# Patient Record
Sex: MALE | Race: BLACK OR AFRICAN AMERICAN | Employment: OTHER | URBAN - METROPOLITAN AREA
[De-identification: names, ages, dates, MRNs, and addresses within clinical notes are randomized per-mention and may not be internally consistent; named-entity substitution may affect disease eponyms.]

---

## 2017-01-09 ENCOUNTER — HOSPITAL ENCOUNTER (OUTPATIENT)
Dept: INFUSION THERAPY | Age: 52
Discharge: HOME | End: 2017-01-09

## 2017-01-27 ENCOUNTER — IP HISTORICAL/CONVERTED ENCOUNTER (OUTPATIENT)
Dept: OTHER | Age: 52
End: 2017-01-27

## 2017-02-06 ENCOUNTER — OFFICE VISIT (OUTPATIENT)
Dept: INTERNAL MEDICINE CLINIC | Age: 52
End: 2017-02-06

## 2017-02-06 ENCOUNTER — HOSPITAL ENCOUNTER (OUTPATIENT)
Dept: INFUSION THERAPY | Age: 52
Discharge: HOME | End: 2017-02-06
Payer: COMMERCIAL

## 2017-02-06 VITALS
TEMPERATURE: 98 F | SYSTOLIC BLOOD PRESSURE: 155 MMHG | DIASTOLIC BLOOD PRESSURE: 83 MMHG | RESPIRATION RATE: 18 BRPM | HEART RATE: 72 BPM

## 2017-02-06 VITALS
HEIGHT: 71 IN | RESPIRATION RATE: 16 BRPM | WEIGHT: 205 LBS | HEART RATE: 82 BPM | DIASTOLIC BLOOD PRESSURE: 86 MMHG | SYSTOLIC BLOOD PRESSURE: 171 MMHG | OXYGEN SATURATION: 98 % | TEMPERATURE: 98.1 F | BODY MASS INDEX: 28.7 KG/M2

## 2017-02-06 DIAGNOSIS — I10 HTN (HYPERTENSION), MALIGNANT: ICD-10-CM

## 2017-02-06 DIAGNOSIS — N18.4 CKD (CHRONIC KIDNEY DISEASE) STAGE 4, GFR 15-29 ML/MIN (HCC): Chronic | ICD-10-CM

## 2017-02-06 DIAGNOSIS — N18.4 TYPE 2 DIABETES MELLITUS WITH STAGE 4 CHRONIC KIDNEY DISEASE, WITHOUT LONG-TERM CURRENT USE OF INSULIN (HCC): Primary | Chronic | ICD-10-CM

## 2017-02-06 DIAGNOSIS — Z23 ENCOUNTER FOR IMMUNIZATION: ICD-10-CM

## 2017-02-06 DIAGNOSIS — Z86.73 HISTORY OF CVA (CEREBROVASCULAR ACCIDENT): Chronic | ICD-10-CM

## 2017-02-06 DIAGNOSIS — E78.2 MIXED HYPERLIPIDEMIA: Chronic | ICD-10-CM

## 2017-02-06 DIAGNOSIS — E11.22 TYPE 2 DIABETES MELLITUS WITH STAGE 4 CHRONIC KIDNEY DISEASE, WITHOUT LONG-TERM CURRENT USE OF INSULIN (HCC): Primary | Chronic | ICD-10-CM

## 2017-02-06 LAB
ALBUMIN SERPL BCP-MCNC: 3.2 G/DL (ref 3.5–5)
ANION GAP BLD CALC-SCNC: 10 MMOL/L (ref 5–15)
BUN SERPL-MCNC: 64 MG/DL (ref 6–20)
BUN/CREAT SERPL: 19 (ref 12–20)
CALCIUM SERPL-MCNC: 9 MG/DL (ref 8.5–10.1)
CHLORIDE SERPL-SCNC: 111 MMOL/L (ref 97–108)
CO2 SERPL-SCNC: 24 MMOL/L (ref 21–32)
CREAT SERPL-MCNC: 3.39 MG/DL (ref 0.7–1.3)
ERYTHROCYTE [DISTWIDTH] IN BLOOD BY AUTOMATED COUNT: 17.6 % (ref 11.5–14.5)
GLUCOSE SERPL-MCNC: 154 MG/DL (ref 65–100)
HCT VFR BLD AUTO: 25.4 % (ref 36.6–50.3)
HGB BLD-MCNC: 8 G/DL (ref 12.1–17)
MCH RBC QN AUTO: 27.1 PG (ref 26–34)
MCHC RBC AUTO-ENTMCNC: 31.5 G/DL (ref 30–36.5)
MCV RBC AUTO: 86.1 FL (ref 80–99)
PHOSPHATE SERPL-MCNC: 3.7 MG/DL (ref 2.6–4.7)
PLATELET # BLD AUTO: 451 K/UL (ref 150–400)
POTASSIUM SERPL-SCNC: 3.8 MMOL/L (ref 3.5–5.1)
RBC # BLD AUTO: 2.95 M/UL (ref 4.1–5.7)
SODIUM SERPL-SCNC: 145 MMOL/L (ref 136–145)
URATE SERPL-MCNC: 12.4 MG/DL (ref 3.5–7.2)
WBC # BLD AUTO: 8.3 K/UL (ref 4.1–11.1)

## 2017-02-06 PROCEDURE — 74011250636 HC RX REV CODE- 250/636: Performed by: INTERNAL MEDICINE

## 2017-02-06 PROCEDURE — 80069 RENAL FUNCTION PANEL: CPT | Performed by: INTERNAL MEDICINE

## 2017-02-06 PROCEDURE — 36415 COLL VENOUS BLD VENIPUNCTURE: CPT | Performed by: INTERNAL MEDICINE

## 2017-02-06 PROCEDURE — 96372 THER/PROPH/DIAG INJ SC/IM: CPT

## 2017-02-06 PROCEDURE — 84550 ASSAY OF BLOOD/URIC ACID: CPT | Performed by: INTERNAL MEDICINE

## 2017-02-06 PROCEDURE — 85027 COMPLETE CBC AUTOMATED: CPT | Performed by: INTERNAL MEDICINE

## 2017-02-06 RX ORDER — BUMETANIDE 1 MG/1
1 TABLET ORAL DAILY
Qty: 90 TAB | Refills: 1 | Status: SHIPPED | OUTPATIENT
Start: 2017-02-06 | End: 2017-10-04 | Stop reason: SDUPTHER

## 2017-02-06 RX ORDER — PRAVASTATIN SODIUM 40 MG/1
40 TABLET ORAL
Qty: 90 TAB | Refills: 1 | Status: SHIPPED | OUTPATIENT
Start: 2017-02-06 | End: 2017-12-06 | Stop reason: SDUPTHER

## 2017-02-06 RX ORDER — CARVEDILOL 6.25 MG/1
6.25 TABLET ORAL 2 TIMES DAILY WITH MEALS
Qty: 180 TAB | Refills: 1 | Status: SHIPPED | OUTPATIENT
Start: 2017-02-06 | End: 2017-09-28 | Stop reason: SDUPTHER

## 2017-02-06 RX ORDER — ISOSORBIDE DINITRATE 30 MG/1
30 TABLET ORAL 3 TIMES DAILY
Qty: 270 TAB | Refills: 1 | Status: SHIPPED | OUTPATIENT
Start: 2017-02-06 | End: 2017-10-23 | Stop reason: SDUPTHER

## 2017-02-06 RX ORDER — HYDRALAZINE HYDROCHLORIDE 10 MG/1
TABLET, FILM COATED ORAL
COMMUNITY
Start: 2017-02-05 | End: 2017-02-06 | Stop reason: SDUPTHER

## 2017-02-06 RX ORDER — GLIPIZIDE 2.5 MG/1
TABLET, EXTENDED RELEASE ORAL DAILY
COMMUNITY
End: 2017-02-06 | Stop reason: SDUPTHER

## 2017-02-06 RX ORDER — BUMETANIDE 1 MG/1
TABLET ORAL
COMMUNITY
Start: 2017-02-05 | End: 2017-02-06 | Stop reason: SDUPTHER

## 2017-02-06 RX ORDER — HYDRALAZINE HYDROCHLORIDE 25 MG/1
25 TABLET, FILM COATED ORAL 3 TIMES DAILY
Qty: 270 TAB | Refills: 1 | Status: SHIPPED | OUTPATIENT
Start: 2017-02-06

## 2017-02-06 RX ORDER — PANTOPRAZOLE SODIUM 40 MG/1
40 TABLET, DELAYED RELEASE ORAL DAILY
Qty: 90 TAB | Refills: 1 | Status: SHIPPED | OUTPATIENT
Start: 2017-02-06 | End: 2017-11-06 | Stop reason: SDUPTHER

## 2017-02-06 RX ORDER — CARVEDILOL 6.25 MG/1
TABLET ORAL
COMMUNITY
Start: 2017-02-05 | End: 2017-02-06 | Stop reason: SDUPTHER

## 2017-02-06 RX ORDER — CALCITRIOL 0.25 UG/1
CAPSULE ORAL
COMMUNITY
Start: 2017-02-05 | End: 2017-02-06 | Stop reason: SDUPTHER

## 2017-02-06 RX ORDER — LOSARTAN POTASSIUM 50 MG/1
50 TABLET ORAL DAILY
Qty: 90 TAB | Refills: 1 | Status: SHIPPED | OUTPATIENT
Start: 2017-02-06

## 2017-02-06 RX ORDER — ISOSORBIDE DINITRATE 30 MG/1
TABLET ORAL
COMMUNITY
Start: 2017-02-05 | End: 2017-02-06 | Stop reason: SDUPTHER

## 2017-02-06 RX ORDER — DOXAZOSIN 1 MG/1
1 TABLET ORAL
Qty: 90 TAB | Refills: 1 | Status: SHIPPED | OUTPATIENT
Start: 2017-02-06

## 2017-02-06 RX ORDER — PANTOPRAZOLE SODIUM 40 MG/1
TABLET, DELAYED RELEASE ORAL
COMMUNITY
Start: 2017-02-05 | End: 2017-02-06 | Stop reason: SDUPTHER

## 2017-02-06 RX ORDER — CALCITRIOL 0.25 UG/1
0.25 CAPSULE ORAL DAILY
Qty: 90 CAP | Refills: 1 | Status: SHIPPED | OUTPATIENT
Start: 2017-02-06 | End: 2017-10-03 | Stop reason: SDUPTHER

## 2017-02-06 RX ORDER — GLIPIZIDE 2.5 MG/1
2.5 TABLET, EXTENDED RELEASE ORAL DAILY
Qty: 90 TAB | Refills: 1 | Status: SHIPPED | OUTPATIENT
Start: 2017-02-06 | End: 2017-12-06 | Stop reason: SDUPTHER

## 2017-02-06 RX ADMIN — ERYTHROPOIETIN 40000 UNITS: 40000 INJECTION, SOLUTION INTRAVENOUS; SUBCUTANEOUS at 13:08

## 2017-02-06 NOTE — PROGRESS NOTES
Michelle Triana is a 46 y.o. male who presents for evaluation of hospital follow up. Last seen by me nov 4. Since then, spent a week in [facility] with chf exacerbation. Was there jan 27-feb 4. Doing much better now. Was at work and was increased sob/segal. Had fluid again, was diuresed, and then some blood loss. Had egd which suggested some mild gastritis/ulcerations, was started on protonix. ROS:  Constitutional: negative for fevers, chills, anorexia and weight loss  Eyes:   negative for visual disturbance and irritation  ENT:   negative for tinnitus,sore throat,nasal congestion,ear pain,hoarseness  Respiratory:  negative for cough, hemoptysis, dyspnea,wheezing  CV:   negative for chest pain, palpitations, lower extremity edema  GI:   negative for nausea, vomiting, diarrhea, abdominal pain,melena  Genitourinary: negative for frequency, dysuria and hematuria  Musculoskel: negative for myalgias, arthralgias, back pain, muscle weakness, joint pain  Neurological:  negative for headaches, dizziness, focal weakness, numbness  Psychiatric:     Negative for depression or anxiety      Past Medical History   Diagnosis Date    [facility]     Hypertension     Ill-defined condition     Stroke ([facility]      2009       History reviewed. No pertinent past surgical history. Family History   Problem Relation Age of Onset    Hypertension Mother     Cancer Father     High Cholesterol Father        Social History     Social History    Marital status: SINGLE     Spouse name: N/A    Number of children: N/A    Years of education: N/A     Occupational History    Not on file. Social History Main Topics    Smoking status: Never Smoker    Smokeless tobacco: Never Used    Alcohol use No    Drug use:  Yes    Sexual activity: Yes     Partners: Female     Birth control/ protection: None     Other Topics Concern    Not on file     Social History Narrative            Visit Vitals    /86 (BP 1 Location: Left arm, BP Patient Position: Sitting)    Pulse 82    Temp 98.1 °F (36.7 °C) (Oral)    Resp 16    Ht 5' 11\" (1.803 m)    Wt 205 lb (93 kg)    SpO2 98%    BMI 28.59 kg/m2       Physical Examination:   General - Well appearing male  HEENT - PERRL, TM no erythema/opacification, normal nasal turbinates, no oropharyngeal erythema or exudate, MMM  Neck - supple, no bruits, no thyroidomegaly, no lymphadenopathy  Pulm - clear to auscultation bilaterally  Cardio - RRR, normal S1 S2, no murmur  Abd - soft, nontender, no masses, no HSM  Extrem - no edema, +2 distal pulses  Neuro-  No focal deficits, CN intact           Diabetic foot exam performed by Ervin Montoya III, DO     Measurement  Response Nurse Comment Physician Comment   Monofilament  R - normal sensation with micro filament  L - normal sensation with micro filament     Pulse DP R - present  L - present     Pulse TP R - present  L - present     Structural deformity R - None  L - None     Skin Integrity / Deformity R - Mild - dry skin  L - Mild - dry skin        Reviewed by:              Assessment/Plan:    1.  htn--continue cozaar, coreg. Isordil added last week. Will also increase hydralazine to 25 tid  2. Hx cva--on asa  3. Dm, type 2--on glipizide, last a1c 6.6. Check urine micro  4.  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 Chronic diastolic chf--well compensated now, on bumex, coreg, cozaar. 6.  Routine adult health maintenance--tdap and pneumvax given today.   States had colon done in nov with dr Johnathan Riedel, will call for results    rtc 3 months for bp        Ervin Montoya III, DO

## 2017-02-06 NOTE — PROGRESS NOTES
Reviewed record in preparation for visit and have obtained necessary documentation. Identified pt with two pt identifiers(name and ). Chief Complaint   Patient presents with   [facility] Follow [facility]    Hypertension       Health Maintenance Due   Topic Date Due    FOOT EXAM Q1  1975    MICROALBUMIN Q1  1975    EYE EXAM RETINAL OR DILATED Q1  1975    Pneumococcal 19-64 Highest Risk (1 of 3 - PCV13) 1984    DTaP/Tdap/Td series (1 - Tdap) 1986    FOBT Q 1 YEAR AGE 50-75  2015       Mr. Bland has a reminder for a \"due or due soon\" health maintenance. I have asked that he discuss health maintenance topic(s) due with His  primary care provider. Coordination of Care Questionnaire:  :     1) Have you been to an emergency room, urgent care clinic since your last visit? yes   Hospitalized since your last visit? yes             2) Have you seen or consulted any other health care providers outside of [address] [place] [address] since your last visit? no  (Include any pap smears or colon screenings in this section.)      Patient is accompanied by self I have received verbal consent from [facility] to discuss any/all medical information while they are present in the room.

## 2017-02-06 NOTE — PATIENT INSTRUCTIONS
Diabetes Foot Health: Care Instructions  Your Care Instructions    When you have diabetes, your feet need extra care and attention. Diabetes can damage the nerve endings and blood vessels in your feet, making you less likely to notice when your feet are injured. Diabetes also limits your body's ability to fight infection and get blood to areas that need it. If you get a minor foot injury, it could become an ulcer or a serious infection. With good foot care, you can prevent most of these problems. Caring for your feet can be quick and easy. Most of the care can be done when you are bathing or getting ready for bed. Follow-up care is a key part of your treatment and safety. Be sure to make and go to all appointments, and call your doctor if you are having problems. Its also a good idea to know your test results and keep a list of the medicines you take. How can you care for yourself at home? · Keep your blood sugar close to normal by watching what and how much you eat, monitoring blood sugar, taking medicines if prescribed, and getting regular exercise. · Do not smoke. Smoking affects blood flow and can make foot problems worse. If you need help quitting, talk to your doctor about stop-smoking programs and medicines. These can increase your chances of quitting for good. · Eat a diet that is low in fats. High fat intake can cause fat to build up in your blood vessels and decrease blood flow. · Inspect your feet daily for blisters, cuts, cracks, or sores. If you cannot see well, use a mirror or have someone help you. · Take care of your feet:  [facility] your feet every day. Use warm (not hot) water. Check the water temperature with your wrists or other part of your body, not your feet. ¨ Dry your feet well. Pat them dry. Do not rub the skin on your feet too hard. Dry well between your toes. If the skin on your feet stays moist, bacteria or a fungus can grow, which can lead to infection.   ¨ Keep your skin soft. Use moisturizing skin cream to keep the skin on your feet soft and prevent calluses and cracks. But do not put the cream between your toes, and stop using any cream that causes a rash. ¨ Clean underneath your toenails carefully. Do not use a sharp object to clean underneath your toenails. Use the blunt end of a nail file or other rounded tool. ¨ Trim and file your toenails straight across to prevent ingrown toenails. Use a nail clipper, not scissors. Use an emery board to smooth the edges. · Change socks daily. Socks without seams are best, because seams often rub the feet. You can find socks for people with diabetes from specialty catalogs. · Look inside your shoes every day for things like gravel or torn linings, which could cause blisters or sores. · Buy shoes that fit well:  ¨ Look for shoes that have plenty of space around the toes. This helps prevent bunions and blisters. ¨ Try on shoes while wearing the kind of socks you will usually wear with the shoes. ¨ Avoid plastic shoes. They may rub your feet and cause blisters. Good shoes should be made of materials that are flexible and breathable, such as leather or cloth. ¨ Break in new shoes slowly by wearing them for no more than an hour a day for several days. Take extra time to check your feet for red areas, blisters, or other problems after you wear new shoes. · Do not go barefoot. Do not wear sandals, and do not wear shoes with very thin soles. Thin soles are easy to puncture. They also do not protect your feet from hot pavement or cold weather. · Have your doctor check your feet during each visit. If you have a foot problem, see your doctor. Do not try to treat an early foot problem at home. Home remedies or treatments that you can buy without a prescription (such as corn removers) can be harmful. · Always get early treatment for foot problems. A minor irritation can lead to a major problem if not properly cared for early.   When should you call for help? Call your doctor now or seek immediate medical care if:  · You have a foot sore, an ulcer or break in the skin that is not healing after 4 days, bleeding corns or calluses, or an ingrown toenail. · You have blue or black areas, which can mean bruising or blood flow problems. · You have peeling skin or tiny blisters between your toes or cracking or oozing of the skin. · You have a fever for more than 24 hours and a foot sore. · You have new numbness or tingling in your feet that does not go away after you move your feet or change positions. · You have unexplained or unusual swelling of the foot or ankle. Watch closely for changes in your health, and be sure to contact your doctor if:  · You cannot do proper foot care. Where can you learn more? Go to http://titi-javier.info/. Enter A739 in the search box to learn more about \"Diabetes Foot Health: Care Instructions. \"  Current as of: May 23, 2016  Content Version: 11.1  © 8825-9728 [employer]. Care instructions adapted under license by [employer] (which disclaims liability or warranty for this information). If you have questions about a medical condition or this instruction, always ask your healthcare professional. [address] any warranty or liability for your use of this information.

## 2017-02-06 NOTE — MR AVS SNAPSHOT
Visit Information Date & Time Provider Department Dept. Phone Encounter #  
 2/6/2017 10:00 AM Pao Escalante, [address] [id number] Follow-up Instructions Return in about 3 months (around 5/6/2017) for htn. Upcoming Health Maintenance Date Due MICROALBUMIN Q1 12/26/1975 EYE EXAM RETINAL OR DILATED Q1 12/26/1975 FOBT Q 1 YEAR AGE 50-75 12/26/2015 HEMOGLOBIN A1C Q6M 4/30/2017 LIPID PANEL Q1 11/4/2017 Pneumococcal 19-64 Highest Risk (2 of 3 - PCV13) 2/6/2018 FOOT EXAM Q1 2/6/2018 DTaP/Tdap/Td series (2 - Td) 2/6/2027 Allergies as of 2/6/2017  Review Complete On: 2/6/2017 By: Gilda Cedeno III, DO No Known Allergies Current Immunizations  Reviewed on 12/12/2016 Name Date Influenza Vaccine (Quad) PF 11/4/2016 11:37 AM  
  
 Not reviewed this visit You Were Diagnosed With   
  
 Codes Comments Type 2 diabetes mellitus with stage 4 chronic kidney disease, without long-term current use of insulin (HCC)    -  Primary ICD-10-CM: E11.22, N18.4 ICD-9-CM: 250.40, 585.4 CKD (chronic kidney disease) stage 4, GFR 15-29 ml/min (HCC)     ICD-10-CM: N18.4 ICD-9-CM: 585.4 Mixed hyperlipidemia     ICD-10-CM: E78.2 ICD-9-CM: 272.2 History of CVA (cerebrovascular accident)     ICD-10-CM: Z86.73 
ICD-9-CM: V12.54 HTN (hypertension), malignant     ICD-10-CM: I10 
ICD-9-CM: 401.0 Vitals BP Pulse Temp Resp Height(growth percentile) Weight(growth percentile) 171/86 (BP 1 Location: Left arm, BP Patient Position: Sitting) 82 98.1 °F (36.7 °C) (Oral) 16 5' 11\" (1.803 m) 205 lb (93 kg) SpO2 BMI Smoking Status 98% 28.59 kg/m2 Never Smoker Vitals History BMI and BSA Data Body Mass Index Body Surface Area 28.59 kg/m 2 2.16 m 2 Preferred Pharmacy Pharmacy Name Phone [facility] [address] [phone] Your Updated Medication List  
  
   
This list is accurate as of: 2/6/17 11:17 AM.  Always use your most recent med list.  
  
  
  
  
 aspirin 81 mg chewable tablet Take 81 mg by mouth daily. bumetanide 1 mg tablet Commonly known as:  Liriano Kaylee Take 1 Tab by mouth daily. calcitRIOL 0.25 mcg capsule Commonly known as:  ROCALTROL Take 1 Cap by mouth daily. carvedilol 6.25 mg tablet Commonly known as:  Media Sheppton Take 1 Tab by mouth two (2) times daily (with meals). doxazosin 1 mg tablet Commonly known as:  CARDURA Take 1 Tab by mouth nightly. glipiZIDE SR 2.5 mg CR tablet Commonly known as:  GLUCOTROL XL Take 1 Tab by mouth daily. hydrALAZINE 25 mg tablet Commonly known as:  APRESOLINE Take 1 Tab by mouth three (3) times daily. isosorbide dinitrate 30 mg tablet Commonly known as:  ISORDIL Take 1 Tab by mouth three (3) times daily. losartan 50 mg tablet Commonly known as:  COZAAR Take 1 Tab by mouth daily. pantoprazole 40 mg tablet Commonly known as:  PROTONIX Take 1 Tab by mouth daily. pravastatin 40 mg tablet Commonly known as:  PRAVACHOL Take 1 Tab by mouth nightly. Prescriptions Sent to Pharmacy Refills  
 bumetanide (BUMEX) 1 mg tablet 1 Sig: Take 1 Tab by mouth daily. Class: Normal  
 Pharmacy: [facility] [address] [place] [address] Ph #: [phone] Route: Oral  
 calcitRIOL (ROCALTROL) 0.25 mcg capsule 1 Sig: Take 1 Cap by mouth daily. Class: Normal  
 Pharmacy: [facility] [address] [place] [address] Ph #: [phone] Route: Oral  
 carvedilol (COREG) 6.25 mg tablet 1 Sig: Take 1 Tab by mouth two (2) times daily (with meals). Class: Normal  
 Pharmacy: [facility] [address] [place] [address] Ph #: [phone] Route: Oral  
 hydrALAZINE (APRESOLINE) 25 mg tablet 1  Sig: Take 1 Tab by mouth three (3) times daily. Class: Normal  
 Pharmacy: [address] [place] [address]. [address] Schaffer, [address] Ph #: [phone] Route: Oral  
 isosorbide dinitrate (ISORDIL) 30 mg tablet 1 Sig: Take 1 Tab by mouth three (3) times daily. Class: Normal  
 Pharmacy: [address] [place] [address]. [address] Schaffer, [address] Ph #: [phone] Route: Oral  
 pantoprazole (PROTONIX) 40 mg tablet 1 Sig: Take 1 Tab by mouth daily. Class: Normal  
 Pharmacy: [address] [place] [address]. [address] Schaffer, [address] Ph #: [phone] Route: Oral  
 glipiZIDE SR (GLUCOTROL XL) 2.5 mg CR tablet 1 Sig: Take 1 Tab by mouth daily. Class: Normal  
 Pharmacy: [address] [place] [address]. [address] Schaffer, [address] Ph #: [phone] Route: Oral  
 losartan (COZAAR) 50 mg tablet 1 Sig: Take 1 Tab by mouth daily. Class: Normal  
 Pharmacy: [address] [place] [address]. [address] Schaffer, [address] Ph #: [phone] Route: Oral  
 doxazosin (CARDURA) 1 mg tablet 1 Sig: Take 1 Tab by mouth nightly. Class: Normal  
 Pharmacy: [address] [place] [address]. [address] Schaffer, [address] Ph #: [phone] Route: Oral  
 pravastatin (PRAVACHOL) 40 mg tablet 1 Sig: Take 1 Tab by mouth nightly. Class: Normal  
 Pharmacy: [address] [place] [address]. [address] Schaffer, [address] Ph #: [phone] Route: Oral  
  
We Performed the Following MICROALBUMIN, UR, RAND W/ MICROALBUMIN/CREA RATIO [id number] CPT(R)] Follow-up Instructions Return in about 3 months (around 5/6/2017) for htn. To-Do List   
 03/06/2017 11:00 AM  
  Appointment with [facility] at [facility] ([phone]) Patient Instructions Diabetes Foot Health: Care Instructions Your Care Instructions When you have diabetes, your feet need extra care and attention.  Diabetes can damage the nerve endings and blood vessels in your feet, making you less likely to notice when your feet are injured. Diabetes also limits your body's ability to fight infection and get blood to areas that need it. If you get a minor foot injury, it could become an ulcer or a serious infection. With good foot care, you can prevent most of these problems. Caring for your feet can be quick and easy. Most of the care can be done when you are bathing or getting ready for bed. Follow-up care is a key part of your treatment and safety. Be sure to make and go to all appointments, and call your doctor if you are having problems. Its also a good idea to know your test results and keep a list of the medicines you take. How can you care for yourself at home? · Keep your blood sugar close to normal by watching what and how much you eat, monitoring blood sugar, taking medicines if prescribed, and getting regular exercise. · Do not smoke. Smoking affects blood flow and can make foot problems worse. If you need help quitting, talk to your doctor about stop-smoking programs and medicines. These can increase your chances of quitting for good. · Eat a diet that is low in fats. High fat intake can cause fat to build up in your blood vessels and decrease blood flow. · Inspect your feet daily for blisters, cuts, cracks, or sores. If you cannot see well, use a mirror or have someone help you. · Take care of your feet: 
[facility] your feet every day. Use warm (not hot) water. Check the water temperature with your wrists or other part of your body, not your feet. ¨ Dry your feet well. Pat them dry. Do not rub the skin on your feet too hard. Dry well between your toes. If the skin on your feet stays moist, bacteria or a fungus can grow, which can lead to infection. ¨ Keep your skin soft. Use moisturizing skin cream to keep the skin on your feet soft and prevent calluses and cracks. But do not put the cream between your toes, and stop using any cream that causes a rash.  
¨ Clean underneath your toenails carefully. Do not use a sharp object to clean underneath your toenails. Use the blunt end of a nail file or other rounded tool. ¨ Trim and file your toenails straight across to prevent ingrown toenails. Use a nail clipper, not scissors. Use an emery board to smooth the edges. · Change socks daily. Socks without seams are best, because seams often rub the feet. You can find socks for people with diabetes from specialty catalogs. · Look inside your shoes every day for things like gravel or torn linings, which could cause blisters or sores. · Buy shoes that fit well: 
¨ Look for shoes that have plenty of space around the toes. This helps prevent bunions and blisters. ¨ Try on shoes while wearing the kind of socks you will usually wear with the shoes. ¨ Avoid plastic shoes. They may rub your feet and cause blisters. Good shoes should be made of materials that are flexible and breathable, such as leather or cloth. ¨ Break in new shoes slowly by wearing them for no more than an hour a day for several days. Take extra time to check your feet for red areas, blisters, or other problems after you wear new shoes. · Do not go barefoot. Do not wear sandals, and do not wear shoes with very thin soles. Thin soles are easy to puncture. They also do not protect your feet from hot pavement or cold weather. · Have your doctor check your feet during each visit. If you have a foot problem, see your doctor. Do not try to treat an early foot problem at home. Home remedies or treatments that you can buy without a prescription (such as corn removers) can be harmful. · Always get early treatment for foot problems. A minor irritation can lead to a major problem if not properly cared for early. When should you call for help?  
Call your doctor now or seek immediate medical care if: 
· You have a foot sore, an ulcer or break in the skin that is not healing after 4 days, bleeding corns or calluses, or an ingrown toenail. · You have blue or black areas, which can mean bruising or blood flow problems. · You have peeling skin or tiny blisters between your toes or cracking or oozing of the skin. · You have a fever for more than 24 hours and a foot sore. · You have new numbness or tingling in your feet that does not go away after you move your feet or change positions. · You have unexplained or unusual swelling of the foot or ankle. Watch closely for changes in your health, and be sure to contact your doctor if: 
· You cannot do proper foot care. Where can you learn more? Go to http://titi-javier.info/. Enter A739 in the search box to learn more about \"Diabetes Foot Health: Care Instructions. \" Current as of: May 23, 2016 Content Version: 11.1 © 6334-4914 [employer]. Care instructions adapted under license by [employer] (which disclaims liability or warranty for this information). If you have questions about a medical condition or this instruction, always ask your healthcare professional. [address] any warranty or liability for your use of this information. Introducing [facility]! Dear Gaby Gilmore: Thank you for requesting a Quire account. Our records indicate that you already have an active Quire account. You can access your account anytime at [email or web address] Did you know that you can access your hospital and ER discharge instructions at any time in Quire? You can also review all of your test results from your hospital stay or ER visit. Additional Information If you have questions, please visit the Frequently Asked Questions section of the Quire website at [email or web address] Remember, Quire is NOT to be used for urgent needs. For medical emergencies, dial 911. Now available from your iPhone and Android!  
  
  
 Please provide this summary of care documentation to your next provider. Your primary care clinician is listed as Emory Flores If you have any questions after today's visit, please call [phone].

## 2017-02-06 NOTE — PROGRESS NOTES
1200 Pt arrived to [facility] ambulatory. Assessment completed. Pt denies any distress or discomfort at this time. Labs drawn and sent. Visit Vitals    /83 (BP 1 Location: Left arm, BP Patient Position: At rest)    Pulse 72    Temp 98 °F (36.7 °C)    Resp 18          labs resulted. HGB 8.0 HCT 25.4  1308 procrit 40,000 units SQ given in left arm     1310 Pt tolerated well. No reaction noted. Pt denies any distress or discomfort at this time.  Pt discharged home ambulatory with next apt

## 2017-02-10 ENCOUNTER — TELEPHONE (OUTPATIENT)
Dept: INTERNAL MEDICINE CLINIC | Age: 52
End: 2017-02-10

## 2017-02-10 NOTE — TELEPHONE ENCOUNTER
I called and spoke with patient. Pt was made aware that according to Dr Emely Hu last note, he would like pt to continue with Losartan and to continue dose of Doxazosin.

## 2017-02-10 NOTE — TELEPHONE ENCOUNTER
Pt (p) [phone], pt said it looks like the wrong medication was called in incorrectly, the Losartan  50 mg, pt said he was taken off of it while in the hospital because of the reaction he was getting and the Doxazason 1 mg was written and he kyaw he usually get the  2 mg and he is not sure if Dr Yocasta Tinajero decreased his dosage.      Message received & copied from [facility] after closing 2/9/17

## 2017-02-14 ENCOUNTER — TELEPHONE (OUTPATIENT)
Dept: INTERNAL MEDICINE CLINIC | Age: 52
End: 2017-02-14

## 2017-02-14 NOTE — TELEPHONE ENCOUNTER
I called and spoke with patient. 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 Pt states he had a stroke in 2009 and is wondering if the stroke is resolved. He was informed that since he has had a stroke he is always at higher risk for another. They will not give him a DOT card because he has over 4 conditions. He states that if the stroke is resolved, he would need a letter saying that it has been resolved. Pt states that he would also like to know if he has heart failure.

## 2017-02-14 NOTE — TELEPHONE ENCOUNTER
I called and spoke with patient. He was made aware that the stoke will no go away since it has already happened. He was also informed that he does have heart disease. He verbalized understanding.

## 2017-02-15 ENCOUNTER — TELEPHONE (OUTPATIENT)
Dept: INTERNAL MEDICINE CLINIC | Age: 52
End: 2017-02-15

## 2017-02-15 NOTE — TELEPHONE ENCOUNTER
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     Dm  Heart disease  htn  Kidney disease  Stroke     Please call pt

## 2017-02-16 NOTE — TELEPHONE ENCOUNTER
I called and spoke with patient. He was informed that these conditions will not be resolved as he is being treated for these conditions.

## 2017-02-27 RX ORDER — PRAVASTATIN SODIUM 40 MG/1
40 TABLET ORAL
Qty: 90 TAB | Refills: 1 | Status: CANCELLED | OUTPATIENT
Start: 2017-02-27

## 2017-02-27 RX ORDER — CARVEDILOL 6.25 MG/1
6.25 TABLET ORAL 2 TIMES DAILY WITH MEALS
Qty: 180 TAB | Refills: 1 | Status: CANCELLED | OUTPATIENT
Start: 2017-02-27

## 2017-02-27 RX ORDER — DOXAZOSIN 1 MG/1
1 TABLET ORAL
Qty: 90 TAB | Refills: 1 | Status: CANCELLED | OUTPATIENT
Start: 2017-02-27

## 2017-02-27 RX ORDER — BUMETANIDE 1 MG/1
1 TABLET ORAL DAILY
Qty: 90 TAB | Refills: 1 | Status: CANCELLED | OUTPATIENT
Start: 2017-02-27

## 2017-02-27 RX ORDER — CALCITRIOL 0.25 UG/1
0.25 CAPSULE ORAL DAILY
Qty: 90 CAP | Refills: 1 | Status: CANCELLED | OUTPATIENT
Start: 2017-02-27

## 2017-02-27 RX ORDER — PANTOPRAZOLE SODIUM 40 MG/1
40 TABLET, DELAYED RELEASE ORAL DAILY
Qty: 90 TAB | Refills: 1 | Status: CANCELLED | OUTPATIENT
Start: 2017-02-27

## 2017-02-27 RX ORDER — ISOSORBIDE DINITRATE 30 MG/1
30 TABLET ORAL 3 TIMES DAILY
Qty: 270 TAB | Refills: 1 | Status: CANCELLED | OUTPATIENT
Start: 2017-02-27

## 2017-02-27 RX ORDER — LOSARTAN POTASSIUM 50 MG/1
50 TABLET ORAL DAILY
Qty: 90 TAB | Refills: 1 | Status: CANCELLED | OUTPATIENT
Start: 2017-02-27

## 2017-02-27 NOTE — TELEPHONE ENCOUNTER
I attempted to contact patient. Pt did not answer. I left voicemail message to return call. These rx have been sent on 2/6/17 to [facility].

## 2017-02-27 NOTE — TELEPHONE ENCOUNTER
I called and spoke with patient. Pt was informed that all of his rx were called in on 2/6/17. He states that when he called they didn't have any for him.

## 2017-02-27 NOTE — TELEPHONE ENCOUNTER
Pt called and state that he is needing these 8 medications refilled to the [address] on file. Pt states that the Doxazosin medication is needing to be 2mg. Please call pt if needed.

## 2017-03-06 ENCOUNTER — HOSPITAL ENCOUNTER (OUTPATIENT)
Dept: INFUSION THERAPY | Age: 52
Discharge: HOME | End: 2017-03-06
Payer: COMMERCIAL

## 2017-03-06 ENCOUNTER — PATIENT MESSAGE (OUTPATIENT)
Dept: INTERNAL MEDICINE CLINIC | Age: 52
End: 2017-03-06

## 2017-03-06 VITALS
RESPIRATION RATE: 18 BRPM | HEART RATE: 84 BPM | BODY MASS INDEX: 29.6 KG/M2 | TEMPERATURE: 98 F | SYSTOLIC BLOOD PRESSURE: 151 MMHG | HEIGHT: 71 IN | WEIGHT: 211.44 LBS | DIASTOLIC BLOOD PRESSURE: 79 MMHG

## 2017-03-06 LAB
ALBUMIN SERPL BCP-MCNC: 3.3 G/DL (ref 3.5–5)
ANION GAP BLD CALC-SCNC: 12 MMOL/L (ref 5–15)
BUN SERPL-MCNC: 41 MG/DL (ref 6–20)
BUN/CREAT SERPL: 10 (ref 12–20)
CALCIUM SERPL-MCNC: 8.5 MG/DL (ref 8.5–10.1)
CHLORIDE SERPL-SCNC: 111 MMOL/L (ref 97–108)
CO2 SERPL-SCNC: 24 MMOL/L (ref 21–32)
CREAT SERPL-MCNC: 3.92 MG/DL (ref 0.7–1.3)
ERYTHROCYTE [DISTWIDTH] IN BLOOD BY AUTOMATED COUNT: 16 % (ref 11.5–14.5)
GLUCOSE SERPL-MCNC: 123 MG/DL (ref 65–100)
HCT VFR BLD AUTO: 30 % (ref 36.6–50.3)
HGB BLD-MCNC: 9.1 G/DL (ref 12.1–17)
MCH RBC QN AUTO: 26.8 PG (ref 26–34)
MCHC RBC AUTO-ENTMCNC: 30.3 G/DL (ref 30–36.5)
MCV RBC AUTO: 88.2 FL (ref 80–99)
PHOSPHATE SERPL-MCNC: 3.8 MG/DL (ref 2.6–4.7)
PLATELET # BLD AUTO: 262 K/UL (ref 150–400)
POTASSIUM SERPL-SCNC: 4.7 MMOL/L (ref 3.5–5.1)
RBC # BLD AUTO: 3.4 M/UL (ref 4.1–5.7)
SODIUM SERPL-SCNC: 147 MMOL/L (ref 136–145)
URATE SERPL-MCNC: 9.4 MG/DL (ref 3.5–7.2)
WBC # BLD AUTO: 5.4 K/UL (ref 4.1–11.1)

## 2017-03-06 PROCEDURE — 36415 COLL VENOUS BLD VENIPUNCTURE: CPT | Performed by: INTERNAL MEDICINE

## 2017-03-06 PROCEDURE — 85027 COMPLETE CBC AUTOMATED: CPT | Performed by: INTERNAL MEDICINE

## 2017-03-06 PROCEDURE — 96372 THER/PROPH/DIAG INJ SC/IM: CPT

## 2017-03-06 PROCEDURE — 84550 ASSAY OF BLOOD/URIC ACID: CPT | Performed by: INTERNAL MEDICINE

## 2017-03-06 PROCEDURE — 74011250636 HC RX REV CODE- 250/636: Performed by: INTERNAL MEDICINE

## 2017-03-06 PROCEDURE — 80069 RENAL FUNCTION PANEL: CPT | Performed by: INTERNAL MEDICINE

## 2017-03-06 RX ADMIN — ERYTHROPOIETIN 40000 UNITS: 40000 INJECTION, SOLUTION INTRAVENOUS; SUBCUTANEOUS at 14:25

## 2017-03-06 NOTE — PROGRESS NOTES
[address] [facility] Stay Note:  Arrived - 0528    Visit Vitals    /79 (BP 1 Location: Left arm, BP Patient Position: At rest)    Pulse 84    Temp 98 °F (36.7 °C)    Resp 18    Ht 5' 11\" (1.803 m)    Wt 95.9 kg (211 lb 7 oz)    BMI 29.49 kg/m2       Assessment - unchanged. Pt voiced no complaints or concern. Recent Results (from the past 12 hour(s))   CBC W/O DIFF    Collection Time: 03/06/17  1:13 PM   Result Value Ref Range    WBC 5.4 4.1 - 11.1 K/uL    RBC 3.40 (L) 4.10 - 5.70 M/uL    HGB 9.1 (L) 12.1 - 17.0 g/dL    HCT 30.0 (L) 36.6 - 50.3 %    MCV 88.2 80.0 - 99.0 FL    MCH 26.8 26.0 - 34.0 PG    MCHC 30.3 30.0 - 36.5 g/dL    RDW 16.0 (H) 11.5 - 14.5 %    PLATELET 677 520 - 362 K/uL   RENAL FUNCTION PANEL    Collection Time: 03/06/17  1:13 PM   Result Value Ref Range    Sodium 147 (H) 136 - 145 mmol/L    Potassium 4.7 3.5 - 5.1 mmol/L    Chloride 111 (H) 97 - 108 mmol/L    CO2 24 21 - 32 mmol/L    Anion gap 12 5 - 15 mmol/L    Glucose 123 (H) 65 - 100 mg/dL    BUN 41 (H) 6 - 20 MG/DL    Creatinine 3.92 (H) 0.70 - 1.30 MG/DL    BUN/Creatinine ratio 10 (L) 12 - 20      GFR est AA 20 (L) >60 ml/min/1.73m2    GFR est non-AA 16 (L) >60 ml/min/1.73m2    Calcium 8.5 8.5 - 10.1 MG/DL    Phosphorus 3.8 2.6 - 4.7 MG/DL    Albumin 3.3 (L) 3.5 - 5.0 g/dL   URIC ACID    Collection Time: 03/06/17  1:13 PM   Result Value Ref Range    Uric acid 9.4 (H) 3.5 - 7.2 MG/DL     Procrit 40,000 units SQ slowly in Left arm. 1430 - Tolerated well. Pt denies any acute problems/changes. Discharged from [facility] ambulatory. No distress.  Next appt: 4/3/17 @ 10 am

## 2017-03-16 RX ORDER — DOXAZOSIN 2 MG/1
2 TABLET ORAL
COMMUNITY

## 2017-03-16 NOTE — TELEPHONE ENCOUNTER
From: Isela Crenshaw  To: Gilda Cedeno III DO  Sent: 3/6/2017 9:34 PM EST  Subject: Update Medical Information    Doxazosin is a 2mg tablet. I have informed the doctor's office two times and this is not updated for my prescription. Could you please take care of this matter.

## 2017-04-03 ENCOUNTER — HOSPITAL ENCOUNTER (OUTPATIENT)
Dept: INFUSION THERAPY | Age: 52
Discharge: HOME | End: 2017-04-03

## 2017-05-01 ENCOUNTER — HOSPITAL ENCOUNTER (OUTPATIENT)
Dept: INFUSION THERAPY | Age: 52
Discharge: HOME | End: 2017-05-01
Payer: COMMERCIAL

## 2017-05-15 ENCOUNTER — HOSPITAL ENCOUNTER (OUTPATIENT)
Dept: INFUSION THERAPY | Age: 52
Discharge: HOME | End: 2017-05-15
Payer: COMMERCIAL

## 2017-05-15 VITALS
SYSTOLIC BLOOD PRESSURE: 154 MMHG | TEMPERATURE: 98.2 F | HEART RATE: 70 BPM | RESPIRATION RATE: 18 BRPM | DIASTOLIC BLOOD PRESSURE: 82 MMHG

## 2017-05-15 LAB
ALBUMIN SERPL BCP-MCNC: 3.3 G/DL (ref 3.5–5)
ANION GAP BLD CALC-SCNC: 9 MMOL/L (ref 5–15)
BUN SERPL-MCNC: 57 MG/DL (ref 6–20)
BUN/CREAT SERPL: 16 (ref 12–20)
CALCIUM SERPL-MCNC: 8.5 MG/DL (ref 8.5–10.1)
CHLORIDE SERPL-SCNC: 112 MMOL/L (ref 97–108)
CO2 SERPL-SCNC: 23 MMOL/L (ref 21–32)
CREAT SERPL-MCNC: 3.55 MG/DL (ref 0.7–1.3)
ERYTHROCYTE [DISTWIDTH] IN BLOOD BY AUTOMATED COUNT: 16.2 % (ref 11.5–14.5)
GLUCOSE SERPL-MCNC: 99 MG/DL (ref 65–100)
HCT VFR BLD AUTO: 29.1 % (ref 36.6–50.3)
HGB BLD-MCNC: 9 G/DL (ref 12.1–17)
MCH RBC QN AUTO: 26.7 PG (ref 26–34)
MCHC RBC AUTO-ENTMCNC: 30.9 G/DL (ref 30–36.5)
MCV RBC AUTO: 86.4 FL (ref 80–99)
PHOSPHATE SERPL-MCNC: 3.8 MG/DL (ref 2.6–4.7)
PLATELET # BLD AUTO: 268 K/UL (ref 150–400)
POTASSIUM SERPL-SCNC: 4.7 MMOL/L (ref 3.5–5.1)
RBC # BLD AUTO: 3.37 M/UL (ref 4.1–5.7)
SODIUM SERPL-SCNC: 144 MMOL/L (ref 136–145)
URATE SERPL-MCNC: 10.7 MG/DL (ref 3.5–7.2)
WBC # BLD AUTO: 6.3 K/UL (ref 4.1–11.1)

## 2017-05-15 PROCEDURE — 84550 ASSAY OF BLOOD/URIC ACID: CPT | Performed by: INTERNAL MEDICINE

## 2017-05-15 PROCEDURE — 85027 COMPLETE CBC AUTOMATED: CPT | Performed by: INTERNAL MEDICINE

## 2017-05-15 PROCEDURE — 80069 RENAL FUNCTION PANEL: CPT | Performed by: INTERNAL MEDICINE

## 2017-05-15 PROCEDURE — 74011250636 HC RX REV CODE- 250/636: Performed by: INTERNAL MEDICINE

## 2017-05-15 PROCEDURE — 96372 THER/PROPH/DIAG INJ SC/IM: CPT

## 2017-05-15 PROCEDURE — 36415 COLL VENOUS BLD VENIPUNCTURE: CPT | Performed by: INTERNAL MEDICINE

## 2017-05-15 RX ADMIN — ERYTHROPOIETIN 40000 UNITS: 40000 INJECTION, SOLUTION INTRAVENOUS; SUBCUTANEOUS at 08:54

## 2017-05-15 NOTE — PROGRESS NOTES
[facility] Short Stay Note:  Arrived - [facility]    Visit Vitals    /82 (BP 1 Location: Left arm, BP Patient Position: At rest)    Pulse 70    Temp 98.2 °F (36.8 °C)    Resp 18       Assessment - unchanged. Patient denies any changes to medical hx. No new complaints or concerns. Labs:   Recent Results (from the past 12 hour(s))   CBC W/O DIFF    Collection Time: 05/15/17  8:31 AM   Result Value Ref Range    WBC 6.3 4.1 - 11.1 K/uL    RBC 3.37 (L) 4.10 - 5.70 M/uL    HGB 9.0 (L) 12.1 - 17.0 g/dL    HCT 29.1 (L) 36.6 - 50.3 %    MCV 86.4 80.0 - 99.0 FL    MCH 26.7 26.0 - 34.0 PG    MCHC 30.9 30.0 - 36.5 g/dL    RDW 16.2 (H) 11.5 - 14.5 %    PLATELET 051 650 - 648 K/uL   RENAL FUNCTION PANEL    Collection Time: 05/15/17  8:31 AM   Result Value Ref Range    Sodium 144 136 - 145 mmol/L    Potassium 4.7 3.5 - 5.1 mmol/L    Chloride 112 (H) 97 - 108 mmol/L    CO2 23 21 - 32 mmol/L    Anion gap 9 5 - 15 mmol/L    Glucose 99 65 - 100 mg/dL    BUN 57 (H) 6 - 20 MG/DL    Creatinine 3.55 (H) 0.70 - 1.30 MG/DL    BUN/Creatinine ratio 16 12 - 20      GFR est AA 22 (L) >60 ml/min/1.73m2    GFR est non-AA 18 (L) >60 ml/min/1.73m2    Calcium 8.5 8.5 - 10.1 MG/DL    Phosphorus 3.8 2.6 - 4.7 MG/DL    Albumin 3.3 (L) 3.5 - 5.0 g/dL   URIC ACID    Collection Time: 05/15/17  8:31 AM   Result Value Ref Range    Uric acid 10.7 (H) 3.5 - 7.2 MG/DL     Procrit 40,000 units SQ slowly in Left arm.    0857 - Tolerated well. Pt denies any acute problems/changes. Discharged from [facility] ambulatory. No distress.  Next appt: 6/12/17

## 2017-05-30 ENCOUNTER — APPOINTMENT (OUTPATIENT)
Dept: INFUSION THERAPY | Age: 52
End: 2017-05-30
Payer: COMMERCIAL

## 2017-06-12 ENCOUNTER — HOSPITAL ENCOUNTER (OUTPATIENT)
Dept: INFUSION THERAPY | Age: 52
Discharge: HOME | End: 2017-06-12
Payer: COMMERCIAL

## 2017-06-12 LAB
ALBUMIN SERPL BCP-MCNC: 3.3 G/DL (ref 3.5–5)
ANION GAP BLD CALC-SCNC: 7 MMOL/L (ref 5–15)
BUN SERPL-MCNC: 53 MG/DL (ref 6–20)
BUN/CREAT SERPL: 13 (ref 12–20)
CALCIUM SERPL-MCNC: 8.7 MG/DL (ref 8.5–10.1)
CHLORIDE SERPL-SCNC: 109 MMOL/L (ref 97–108)
CO2 SERPL-SCNC: 24 MMOL/L (ref 21–32)
CREAT SERPL-MCNC: 4.2 MG/DL (ref 0.7–1.3)
ERYTHROCYTE [DISTWIDTH] IN BLOOD BY AUTOMATED COUNT: 15.9 % (ref 11.5–14.5)
FERRITIN SERPL-MCNC: 241 NG/ML (ref 26–388)
GLUCOSE SERPL-MCNC: 114 MG/DL (ref 65–100)
HCT VFR BLD AUTO: 30.6 % (ref 36.6–50.3)
HGB BLD-MCNC: 9.6 G/DL (ref 12.1–17)
IRON SATN MFR SERPL: 26 % (ref 20–50)
IRON SERPL-MCNC: 60 UG/DL (ref 35–150)
MCH RBC QN AUTO: 27 PG (ref 26–34)
MCHC RBC AUTO-ENTMCNC: 31.4 G/DL (ref 30–36.5)
MCV RBC AUTO: 86.2 FL (ref 80–99)
PHOSPHATE SERPL-MCNC: 4.1 MG/DL (ref 2.6–4.7)
PLATELET # BLD AUTO: 267 K/UL (ref 150–400)
POTASSIUM SERPL-SCNC: 4.6 MMOL/L (ref 3.5–5.1)
RBC # BLD AUTO: 3.55 M/UL (ref 4.1–5.7)
SODIUM SERPL-SCNC: 140 MMOL/L (ref 136–145)
TIBC SERPL-MCNC: 230 UG/DL (ref 250–450)
URATE SERPL-MCNC: 10.2 MG/DL (ref 3.5–7.2)
WBC # BLD AUTO: 6.1 K/UL (ref 4.1–11.1)

## 2017-06-12 PROCEDURE — 84550 ASSAY OF BLOOD/URIC ACID: CPT | Performed by: INTERNAL MEDICINE

## 2017-06-12 PROCEDURE — 82728 ASSAY OF FERRITIN: CPT | Performed by: INTERNAL MEDICINE

## 2017-06-12 PROCEDURE — 74011250636 HC RX REV CODE- 250/636: Performed by: INTERNAL MEDICINE

## 2017-06-12 PROCEDURE — 85027 COMPLETE CBC AUTOMATED: CPT | Performed by: INTERNAL MEDICINE

## 2017-06-12 PROCEDURE — 96372 THER/PROPH/DIAG INJ SC/IM: CPT

## 2017-06-12 PROCEDURE — 80069 RENAL FUNCTION PANEL: CPT | Performed by: INTERNAL MEDICINE

## 2017-06-12 PROCEDURE — 83540 ASSAY OF IRON: CPT | Performed by: INTERNAL MEDICINE

## 2017-06-12 PROCEDURE — 36415 COLL VENOUS BLD VENIPUNCTURE: CPT | Performed by: INTERNAL MEDICINE

## 2017-06-12 RX ADMIN — ERYTHROPOIETIN 40000 UNITS: 40000 INJECTION, SOLUTION INTRAVENOUS; SUBCUTANEOUS at 09:40

## 2017-06-26 ENCOUNTER — APPOINTMENT (OUTPATIENT)
Dept: INFUSION THERAPY | Age: 52
End: 2017-06-26
Payer: COMMERCIAL

## 2017-07-10 ENCOUNTER — HOSPITAL ENCOUNTER (OUTPATIENT)
Dept: INFUSION THERAPY | Age: 52
Discharge: HOME | End: 2017-07-10
Payer: COMMERCIAL

## 2017-07-10 VITALS
OXYGEN SATURATION: 99 % | SYSTOLIC BLOOD PRESSURE: 155 MMHG | RESPIRATION RATE: 18 BRPM | HEART RATE: 78 BPM | DIASTOLIC BLOOD PRESSURE: 83 MMHG | TEMPERATURE: 98.2 F

## 2017-07-10 LAB
ALBUMIN SERPL BCP-MCNC: 3.4 G/DL (ref 3.5–5)
ANION GAP BLD CALC-SCNC: 4 MMOL/L (ref 5–15)
BUN SERPL-MCNC: 59 MG/DL (ref 6–20)
BUN/CREAT SERPL: 13 (ref 12–20)
CALCIUM SERPL-MCNC: 8.7 MG/DL (ref 8.5–10.1)
CHLORIDE SERPL-SCNC: 107 MMOL/L (ref 97–108)
CO2 SERPL-SCNC: 28 MMOL/L (ref 21–32)
CREAT SERPL-MCNC: 4.52 MG/DL (ref 0.7–1.3)
ERYTHROCYTE [DISTWIDTH] IN BLOOD BY AUTOMATED COUNT: 15.1 % (ref 11.5–14.5)
GLUCOSE SERPL-MCNC: 93 MG/DL (ref 65–100)
HCT VFR BLD AUTO: 31.2 % (ref 36.6–50.3)
HGB BLD-MCNC: 9.9 G/DL (ref 12.1–17)
MCH RBC QN AUTO: 27.3 PG (ref 26–34)
MCHC RBC AUTO-ENTMCNC: 31.7 G/DL (ref 30–36.5)
MCV RBC AUTO: 86 FL (ref 80–99)
PHOSPHATE SERPL-MCNC: 4.4 MG/DL (ref 2.6–4.7)
PLATELET # BLD AUTO: 254 K/UL (ref 150–400)
POTASSIUM SERPL-SCNC: 5.1 MMOL/L (ref 3.5–5.1)
RBC # BLD AUTO: 3.63 M/UL (ref 4.1–5.7)
SODIUM SERPL-SCNC: 139 MMOL/L (ref 136–145)
URATE SERPL-MCNC: 10.4 MG/DL (ref 3.5–7.2)
WBC # BLD AUTO: 5.7 K/UL (ref 4.1–11.1)

## 2017-07-10 PROCEDURE — 74011250636 HC RX REV CODE- 250/636: Performed by: INTERNAL MEDICINE

## 2017-07-10 PROCEDURE — 84550 ASSAY OF BLOOD/URIC ACID: CPT | Performed by: INTERNAL MEDICINE

## 2017-07-10 PROCEDURE — 36415 COLL VENOUS BLD VENIPUNCTURE: CPT | Performed by: INTERNAL MEDICINE

## 2017-07-10 PROCEDURE — 80069 RENAL FUNCTION PANEL: CPT | Performed by: INTERNAL MEDICINE

## 2017-07-10 PROCEDURE — 85027 COMPLETE CBC AUTOMATED: CPT | Performed by: INTERNAL MEDICINE

## 2017-07-10 PROCEDURE — 96372 THER/PROPH/DIAG INJ SC/IM: CPT

## 2017-07-10 RX ADMIN — ERYTHROPOIETIN 40000 UNITS: 40000 INJECTION, SOLUTION INTRAVENOUS; SUBCUTANEOUS at 11:02

## 2017-07-10 NOTE — PROGRESS NOTES
[address] [facility] Stay Note:  Arrived - 1025    Visit Vitals    /83 (BP 1 Location: Left arm, BP Patient Position: Sitting)    Pulse 78    Temp 98.2 °F (36.8 °C)    Resp 18    SpO2 99%     Labs drawn per orders including CBC w/o diff, Renal panel, and Uric Acid. Recent Results (from the past 12 hour(s))   CBC W/O DIFF    Collection Time: 07/10/17 10:29 AM   Result Value Ref Range    WBC 5.7 4.1 - 11.1 K/uL    RBC 3.63 (L) 4.10 - 5.70 M/uL    HGB 9.9 (L) 12.1 - 17.0 g/dL    HCT 31.2 (L) 36.6 - 50.3 %    MCV 86.0 80.0 - 99.0 FL    MCH 27.3 26.0 - 34.0 PG    MCHC 31.7 30.0 - 36.5 g/dL    RDW 15.1 (H) 11.5 - 14.5 %    PLATELET 530 804 - 918 K/uL   RENAL FUNCTION PANEL    Collection Time: 07/10/17 10:29 AM   Result Value Ref Range    Sodium 139 136 - 145 mmol/L    Potassium 5.1 3.5 - 5.1 mmol/L    Chloride 107 97 - 108 mmol/L    CO2 28 21 - 32 mmol/L    Anion gap 4 (L) 5 - 15 mmol/L    Glucose 93 65 - 100 mg/dL    BUN 59 (H) 6 - 20 MG/DL    Creatinine 4.52 (H) 0.70 - 1.30 MG/DL    BUN/Creatinine ratio 13 12 - 20      GFR est AA 17 (L) >60 ml/min/1.73m2    GFR est non-AA 14 (L) >60 ml/min/1.73m2    Calcium 8.7 8.5 - 10.1 MG/DL    Phosphorus 4.4 2.6 - 4.7 MG/DL    Albumin 3.4 (L) 3.5 - 5.0 g/dL   URIC ACID    Collection Time: 07/10/17 10:29 AM   Result Value Ref Range    Uric acid 10.4 (H) 3.5 - 7.2 MG/DL       Assessment - unchanged. Hgb - 9.9  Procrit 40,000 units SQ slowly in right arm.    1100 - Tolerated well. Pt denies any acute problems/changes. Discharged from [facility] ambulatory. No distress. Next appt: 8/7/17 @ 1000.

## 2017-08-07 ENCOUNTER — HOSPITAL ENCOUNTER (OUTPATIENT)
Dept: INFUSION THERAPY | Age: 52
End: 2017-08-07

## 2017-08-15 ENCOUNTER — HOSPITAL ENCOUNTER (OUTPATIENT)
Dept: INFUSION THERAPY | Age: 52
Discharge: HOME | End: 2017-08-15
Payer: COMMERCIAL

## 2017-08-21 ENCOUNTER — HOSPITAL ENCOUNTER (OUTPATIENT)
Dept: INFUSION THERAPY | Age: 52
Discharge: HOME | End: 2017-08-21
Payer: COMMERCIAL

## 2017-08-21 VITALS
TEMPERATURE: 98.2 F | HEART RATE: 70 BPM | SYSTOLIC BLOOD PRESSURE: 123 MMHG | OXYGEN SATURATION: 99 % | DIASTOLIC BLOOD PRESSURE: 75 MMHG | RESPIRATION RATE: 16 BRPM

## 2017-08-21 LAB
25(OH)D3 SERPL-MCNC: 28.6 NG/ML (ref 30–100)
ALBUMIN SERPL-MCNC: 3.6 G/DL (ref 3.5–5)
ANION GAP SERPL CALC-SCNC: 4 MMOL/L (ref 5–15)
BUN SERPL-MCNC: 63 MG/DL (ref 6–20)
BUN/CREAT SERPL: 14 (ref 12–20)
CALCIUM SERPL-MCNC: 8.9 MG/DL (ref 8.5–10.1)
CALCIUM SERPL-MCNC: 8.9 MG/DL (ref 8.5–10.1)
CHLORIDE SERPL-SCNC: 108 MMOL/L (ref 97–108)
CO2 SERPL-SCNC: 27 MMOL/L (ref 21–32)
CREAT SERPL-MCNC: 4.35 MG/DL (ref 0.7–1.3)
CREAT UR-MCNC: 25.8 MG/DL
ERYTHROCYTE [DISTWIDTH] IN BLOOD BY AUTOMATED COUNT: 15.1 % (ref 11.5–14.5)
GLUCOSE SERPL-MCNC: 83 MG/DL (ref 65–100)
HCT VFR BLD AUTO: 30.5 % (ref 36.6–50.3)
HGB BLD-MCNC: 9.7 G/DL (ref 12.1–17)
MCH RBC QN AUTO: 27.8 PG (ref 26–34)
MCHC RBC AUTO-ENTMCNC: 31.8 G/DL (ref 30–36.5)
MCV RBC AUTO: 87.4 FL (ref 80–99)
PHOSPHATE SERPL-MCNC: 4 MG/DL (ref 2.6–4.7)
PLATELET # BLD AUTO: 264 K/UL (ref 150–400)
POTASSIUM SERPL-SCNC: 5.5 MMOL/L (ref 3.5–5.1)
PTH-INTACT SERPL-MCNC: 247.7 PG/ML (ref 14–72)
RBC # BLD AUTO: 3.49 M/UL (ref 4.1–5.7)
SODIUM SERPL-SCNC: 139 MMOL/L (ref 136–145)
URATE SERPL-MCNC: 10.4 MG/DL (ref 3.5–7.2)
WBC # BLD AUTO: 5.7 K/UL (ref 4.1–11.1)

## 2017-08-21 PROCEDURE — 83970 ASSAY OF PARATHORMONE: CPT | Performed by: INTERNAL MEDICINE

## 2017-08-21 PROCEDURE — 80069 RENAL FUNCTION PANEL: CPT | Performed by: INTERNAL MEDICINE

## 2017-08-21 PROCEDURE — 85027 COMPLETE CBC AUTOMATED: CPT | Performed by: INTERNAL MEDICINE

## 2017-08-21 PROCEDURE — 74011250636 HC RX REV CODE- 250/636: Performed by: INTERNAL MEDICINE

## 2017-08-21 PROCEDURE — 96372 THER/PROPH/DIAG INJ SC/IM: CPT

## 2017-08-21 PROCEDURE — 82570 ASSAY OF URINE CREATININE: CPT | Performed by: INTERNAL MEDICINE

## 2017-08-21 PROCEDURE — 82306 VITAMIN D 25 HYDROXY: CPT | Performed by: INTERNAL MEDICINE

## 2017-08-21 PROCEDURE — 84550 ASSAY OF BLOOD/URIC ACID: CPT | Performed by: INTERNAL MEDICINE

## 2017-08-21 PROCEDURE — 36415 COLL VENOUS BLD VENIPUNCTURE: CPT | Performed by: INTERNAL MEDICINE

## 2017-08-21 RX ADMIN — ERYTHROPOIETIN 40000 UNITS: 40000 INJECTION, SOLUTION INTRAVENOUS; SUBCUTANEOUS at 09:45

## 2017-08-21 NOTE — PROGRESS NOTES
0855 Pt arrived at [facility] ambulatory and in no distress for Procrit and Labs. Assessment completed, no new complaints voiced. /75 (BP 1 Location: Left arm, BP Patient Position: Sitting)  Pulse 70  Temp 98.2 °F (36.8 °C)  Resp 16  SpO2 99%     Recent Results (from the past 12 hour(s))   RENAL FUNCTION PANEL    Collection Time: 08/21/17  9:02 AM   Result Value Ref Range    Sodium 139 136 - 145 mmol/L    Potassium 5.5 (H) 3.5 - 5.1 mmol/L    Chloride 108 97 - 108 mmol/L    CO2 27 21 - 32 mmol/L    Anion gap 4 (L) 5 - 15 mmol/L    Glucose 83 65 - 100 mg/dL    BUN 63 (H) 6 - 20 MG/DL    Creatinine 4.35 (H) 0.70 - 1.30 MG/DL    BUN/Creatinine ratio 14 12 - 20      GFR est AA 18 (L) >60 ml/min/1.73m2    GFR est non-AA 14 (L) >60 ml/min/1.73m2    Calcium 8.9 8.5 - 10.1 MG/DL    Phosphorus 4.0 2.6 - 4.7 MG/DL    Albumin 3.6 3.5 - 5.0 g/dL   URIC ACID    Collection Time: 08/21/17  9:02 AM   Result Value Ref Range    Uric acid 10.4 (H) 3.5 - 7.2 MG/DL   CBC W/O DIFF    Collection Time: 08/21/17  9:02 AM   Result Value Ref Range    WBC 5.7 4.1 - 11.1 K/uL    RBC 3.49 (L) 4.10 - 5.70 M/uL    HGB 9.7 (L) 12.1 - 17.0 g/dL    HCT 30.5 (L) 36.6 - 50.3 %    MCV 87.4 80.0 - 99.0 FL    MCH 27.8 26.0 - 34.0 PG    MCHC 31.8 30.0 - 36.5 g/dL    RDW 15.1 (H) 11.5 - 14.5 %    PLATELET 680 869 - 590 K/uL   CREATININE, UR, RANDOM    Collection Time: 08/21/17  9:03 AM   Result Value Ref Range    Creatinine, urine 25.80 mg/dL       Multiple labs still pending. See [facility] for results. Medications received:  Procrit 40,000 units sq    0945 Tolerated treatment well, no adverse reaction noted. D/Cd from [facility] ambulatory and in no distress accompanied by self. Next appt 9/18 @ 1000.

## 2017-09-05 ENCOUNTER — APPOINTMENT (OUTPATIENT)
Dept: INFUSION THERAPY | Age: 52
End: 2017-09-05
Payer: COMMERCIAL

## 2017-09-06 ENCOUNTER — TELEPHONE (OUTPATIENT)
Dept: INTERNAL MEDICINE CLINIC | Age: 52
End: 2017-09-06

## 2017-09-06 NOTE — TELEPHONE ENCOUNTER
Susie Jeffries 82 neurology [facility] would like to speak with the nurse about cardiac history. Please call.

## 2017-09-12 ENCOUNTER — HOSPITAL ENCOUNTER (OUTPATIENT)
Age: 52
Discharge: HOME | End: 2017-09-27
Attending: PHYSICAL MEDICINE & REHABILITATION | Admitting: PHYSICAL MEDICINE & REHABILITATION

## 2017-09-12 ENCOUNTER — APPOINTMENT (OUTPATIENT)
Dept: INFUSION THERAPY | Age: 52
End: 2017-09-12
Payer: COMMERCIAL

## 2017-09-12 PROCEDURE — 74011250637 HC RX REV CODE- 250/637: Performed by: PHYSICAL MEDICINE & REHABILITATION

## 2017-09-12 PROCEDURE — 74011250636 HC RX REV CODE- 250/636: Performed by: PHYSICAL MEDICINE & REHABILITATION

## 2017-09-12 RX ORDER — ONDANSETRON 4 MG/1
4 TABLET, ORALLY DISINTEGRATING ORAL
Status: DISCONTINUED | OUTPATIENT
Start: 2017-09-12 | End: 2017-09-27 | Stop reason: HOSPADM

## 2017-09-12 RX ORDER — ADHESIVE BANDAGE
30 BANDAGE TOPICAL DAILY PRN
Status: DISCONTINUED | OUTPATIENT
Start: 2017-09-12 | End: 2017-09-27 | Stop reason: HOSPADM

## 2017-09-12 RX ORDER — HYDRALAZINE HYDROCHLORIDE 25 MG/1
25 TABLET, FILM COATED ORAL 2 TIMES DAILY
Status: DISCONTINUED | OUTPATIENT
Start: 2017-09-13 | End: 2017-09-27 | Stop reason: HOSPADM

## 2017-09-12 RX ORDER — GUAIFENESIN 100 MG/5ML
81 LIQUID (ML) ORAL DAILY
Status: DISCONTINUED | OUTPATIENT
Start: 2017-09-13 | End: 2017-09-27 | Stop reason: HOSPADM

## 2017-09-12 RX ORDER — PANTOPRAZOLE SODIUM 40 MG/1
40 TABLET, DELAYED RELEASE ORAL
Status: DISCONTINUED | OUTPATIENT
Start: 2017-09-13 | End: 2017-09-27 | Stop reason: HOSPADM

## 2017-09-12 RX ORDER — CARVEDILOL 6.25 MG/1
6.25 TABLET ORAL 2 TIMES DAILY WITH MEALS
Status: DISCONTINUED | OUTPATIENT
Start: 2017-09-13 | End: 2017-09-27 | Stop reason: HOSPADM

## 2017-09-12 RX ORDER — DEXTROSE 50 % IN WATER (D50W) INTRAVENOUS SYRINGE
25 AS NEEDED
Status: DISCONTINUED | OUTPATIENT
Start: 2017-09-12 | End: 2017-09-27 | Stop reason: HOSPADM

## 2017-09-12 RX ORDER — ACETAMINOPHEN 325 MG/1
650 TABLET ORAL
Status: DISCONTINUED | OUTPATIENT
Start: 2017-09-12 | End: 2017-09-27 | Stop reason: HOSPADM

## 2017-09-12 RX ORDER — INSULIN LISPRO 100 [IU]/ML
INJECTION, SOLUTION INTRAVENOUS; SUBCUTANEOUS
Status: DISCONTINUED | OUTPATIENT
Start: 2017-09-12 | End: 2017-09-25

## 2017-09-12 RX ORDER — VALSARTAN 80 MG/1
80 TABLET ORAL DAILY
Status: DISCONTINUED | OUTPATIENT
Start: 2017-09-13 | End: 2017-09-27 | Stop reason: HOSPADM

## 2017-09-12 RX ORDER — NITROGLYCERIN 0.4 MG/1
0.4 TABLET SUBLINGUAL
Status: DISCONTINUED | OUTPATIENT
Start: 2017-09-12 | End: 2017-09-27 | Stop reason: HOSPADM

## 2017-09-12 RX ORDER — GLIPIZIDE 2.5 MG/1
2.5 TABLET, EXTENDED RELEASE ORAL
Status: DISCONTINUED | OUTPATIENT
Start: 2017-09-13 | End: 2017-09-27 | Stop reason: HOSPADM

## 2017-09-12 RX ORDER — BUMETANIDE 1 MG/1
1 TABLET ORAL DAILY
Status: DISCONTINUED | OUTPATIENT
Start: 2017-09-13 | End: 2017-09-27 | Stop reason: HOSPADM

## 2017-09-12 RX ORDER — DOXAZOSIN 2 MG/1
1 TABLET ORAL DAILY
Status: DISCONTINUED | OUTPATIENT
Start: 2017-09-13 | End: 2017-09-27 | Stop reason: HOSPADM

## 2017-09-12 RX ORDER — ZOLPIDEM TARTRATE 5 MG/1
5 TABLET ORAL
Status: DISCONTINUED | OUTPATIENT
Start: 2017-09-12 | End: 2017-09-27 | Stop reason: HOSPADM

## 2017-09-12 RX ORDER — HEPARIN SODIUM 5000 [USP'U]/ML
5000 INJECTION, SOLUTION INTRAVENOUS; SUBCUTANEOUS EVERY 8 HOURS
Status: DISCONTINUED | OUTPATIENT
Start: 2017-09-12 | End: 2017-09-27 | Stop reason: HOSPADM

## 2017-09-12 RX ORDER — AMOXICILLIN 250 MG
1 CAPSULE ORAL
Status: DISCONTINUED | OUTPATIENT
Start: 2017-09-12 | End: 2017-09-27 | Stop reason: HOSPADM

## 2017-09-12 RX ORDER — CALCITRIOL 0.25 UG/1
0.25 CAPSULE ORAL DAILY
Status: DISCONTINUED | OUTPATIENT
Start: 2017-09-13 | End: 2017-09-27 | Stop reason: HOSPADM

## 2017-09-12 RX ORDER — DOXAZOSIN 2 MG/1
2 TABLET ORAL
Status: DISCONTINUED | OUTPATIENT
Start: 2017-09-12 | End: 2017-09-27 | Stop reason: HOSPADM

## 2017-09-12 RX ORDER — PRAVASTATIN SODIUM 40 MG/1
40 TABLET ORAL
Status: DISCONTINUED | OUTPATIENT
Start: 2017-09-12 | End: 2017-09-27 | Stop reason: HOSPADM

## 2017-09-12 RX ORDER — MAGNESIUM SULFATE 100 %
16 CRYSTALS MISCELLANEOUS AS NEEDED
Status: DISCONTINUED | OUTPATIENT
Start: 2017-09-12 | End: 2017-09-27 | Stop reason: HOSPADM

## 2017-09-12 RX ORDER — FACIAL-BODY WIPES
10 EACH TOPICAL DAILY PRN
Status: DISCONTINUED | OUTPATIENT
Start: 2017-09-12 | End: 2017-09-27 | Stop reason: HOSPADM

## 2017-09-12 RX ADMIN — ISOSORBIDE DINITRATE 30 MG: 10 TABLET ORAL at 21:50

## 2017-09-12 RX ADMIN — SENNOSIDES AND DOCUSATE SODIUM 1 TABLET: 8.6; 5 TABLET ORAL at 21:50

## 2017-09-12 RX ADMIN — PRAVASTATIN SODIUM 40 MG: 40 TABLET ORAL at 21:50

## 2017-09-12 RX ADMIN — HEPARIN SODIUM 5000 UNITS: 5000 INJECTION, SOLUTION INTRAVENOUS; SUBCUTANEOUS at 21:50

## 2017-09-12 RX ADMIN — DOXAZOSIN 2 MG: 2 TABLET ORAL at 21:50

## 2017-09-13 LAB
25(OH)D3 SERPL-MCNC: 23.5 NG/ML (ref 30–100)
ALBUMIN SERPL-MCNC: 2.9 G/DL (ref 3.5–5)
ALBUMIN SERPL-MCNC: 3.1 G/DL (ref 3.5–5)
ALBUMIN/GLOB SERPL: 0.6 {RATIO} (ref 1.1–2.2)
ALP SERPL-CCNC: 86 U/L (ref 45–117)
ALT SERPL-CCNC: 11 U/L (ref 12–78)
ANION GAP SERPL CALC-SCNC: 10 MMOL/L (ref 5–15)
ANION GAP SERPL CALC-SCNC: 9 MMOL/L (ref 5–15)
APPEARANCE UR: CLEAR
AST SERPL-CCNC: 11 U/L (ref 15–37)
BACTERIA URNS QL MICRO: NEGATIVE /HPF
BILIRUB SERPL-MCNC: 0.5 MG/DL (ref 0.2–1)
BILIRUB UR QL: NEGATIVE
BUN SERPL-MCNC: 56 MG/DL (ref 6–20)
BUN SERPL-MCNC: 58 MG/DL (ref 6–20)
BUN/CREAT SERPL: 16 (ref 12–20)
BUN/CREAT SERPL: 17 (ref 12–20)
CALCIUM SERPL-MCNC: 8.6 MG/DL (ref 8.5–10.1)
CALCIUM SERPL-MCNC: 8.9 MG/DL (ref 8.5–10.1)
CHLORIDE SERPL-SCNC: 106 MMOL/L (ref 97–108)
CHLORIDE SERPL-SCNC: 111 MMOL/L (ref 97–108)
CO2 SERPL-SCNC: 19 MMOL/L (ref 21–32)
CO2 SERPL-SCNC: 23 MMOL/L (ref 21–32)
COLOR UR: ABNORMAL
CREAT SERPL-MCNC: 3.23 MG/DL (ref 0.7–1.3)
CREAT SERPL-MCNC: 3.69 MG/DL (ref 0.7–1.3)
EPITH CASTS URNS QL MICRO: ABNORMAL /LPF
ERYTHROCYTE [DISTWIDTH] IN BLOOD BY AUTOMATED COUNT: 15.4 % (ref 11.5–14.5)
GLOBULIN SER CALC-MCNC: 4.9 G/DL (ref 2–4)
GLUCOSE SERPL-MCNC: 179 MG/DL (ref 65–100)
GLUCOSE SERPL-MCNC: 99 MG/DL (ref 65–100)
GLUCOSE UR STRIP.AUTO-MCNC: NEGATIVE MG/DL
HCT VFR BLD AUTO: 32.6 % (ref 36.6–50.3)
HGB BLD-MCNC: 10.3 G/DL (ref 12.1–17)
HGB UR QL STRIP: ABNORMAL
KETONES UR QL STRIP.AUTO: NEGATIVE MG/DL
LEUKOCYTE ESTERASE UR QL STRIP.AUTO: NEGATIVE
MAGNESIUM SERPL-MCNC: 2.2 MG/DL (ref 1.6–2.4)
MCH RBC QN AUTO: 27 PG (ref 26–34)
MCHC RBC AUTO-ENTMCNC: 31.6 G/DL (ref 30–36.5)
MCV RBC AUTO: 85.6 FL (ref 80–99)
NITRITE UR QL STRIP.AUTO: NEGATIVE
PH UR STRIP: 5 [PH] (ref 5–8)
PHOSPHATE SERPL-MCNC: 4 MG/DL (ref 2.6–4.7)
PLATELET # BLD AUTO: 355 K/UL (ref 150–400)
POTASSIUM SERPL-SCNC: 4.8 MMOL/L (ref 3.5–5.1)
POTASSIUM SERPL-SCNC: 5 MMOL/L (ref 3.5–5.1)
PROT SERPL-MCNC: 8 G/DL (ref 6.4–8.2)
PROT UR STRIP-MCNC: 100 MG/DL
RBC # BLD AUTO: 3.81 M/UL (ref 4.1–5.7)
RBC #/AREA URNS HPF: ABNORMAL /HPF (ref 0–5)
SODIUM SERPL-SCNC: 138 MMOL/L (ref 136–145)
SODIUM SERPL-SCNC: 140 MMOL/L (ref 136–145)
SP GR UR REFRACTOMETRY: 1.02 (ref 1–1.03)
UROBILINOGEN UR QL STRIP.AUTO: 1 EU/DL (ref 0.2–1)
WBC # BLD AUTO: 7.4 K/UL (ref 4.1–11.1)
WBC URNS QL MICRO: ABNORMAL /HPF (ref 0–4)

## 2017-09-13 PROCEDURE — 36415 COLL VENOUS BLD VENIPUNCTURE: CPT | Performed by: PHYSICAL MEDICINE & REHABILITATION

## 2017-09-13 PROCEDURE — 82306 VITAMIN D 25 HYDROXY: CPT | Performed by: PHYSICAL MEDICINE & REHABILITATION

## 2017-09-13 PROCEDURE — 87086 URINE CULTURE/COLONY COUNT: CPT | Performed by: PHYSICAL MEDICINE & REHABILITATION

## 2017-09-13 PROCEDURE — 83735 ASSAY OF MAGNESIUM: CPT | Performed by: PHYSICAL MEDICINE & REHABILITATION

## 2017-09-13 PROCEDURE — 80053 COMPREHEN METABOLIC PANEL: CPT | Performed by: PHYSICAL MEDICINE & REHABILITATION

## 2017-09-13 PROCEDURE — 74011250636 HC RX REV CODE- 250/636: Performed by: PHYSICAL MEDICINE & REHABILITATION

## 2017-09-13 PROCEDURE — 74011250637 HC RX REV CODE- 250/637: Performed by: PHYSICAL MEDICINE & REHABILITATION

## 2017-09-13 PROCEDURE — 81001 URINALYSIS AUTO W/SCOPE: CPT | Performed by: PHYSICAL MEDICINE & REHABILITATION

## 2017-09-13 PROCEDURE — 74011636637 HC RX REV CODE- 636/637: Performed by: PHYSICAL MEDICINE & REHABILITATION

## 2017-09-13 PROCEDURE — 80069 RENAL FUNCTION PANEL: CPT | Performed by: PHYSICAL MEDICINE & REHABILITATION

## 2017-09-13 PROCEDURE — 85027 COMPLETE CBC AUTOMATED: CPT | Performed by: PHYSICAL MEDICINE & REHABILITATION

## 2017-09-13 RX ADMIN — HEPARIN SODIUM 5000 UNITS: 5000 INJECTION, SOLUTION INTRAVENOUS; SUBCUTANEOUS at 21:42

## 2017-09-13 RX ADMIN — ISOSORBIDE DINITRATE 30 MG: 10 TABLET ORAL at 13:53

## 2017-09-13 RX ADMIN — HEPARIN SODIUM 5000 UNITS: 5000 INJECTION, SOLUTION INTRAVENOUS; SUBCUTANEOUS at 07:04

## 2017-09-13 RX ADMIN — DOXAZOSIN 1 MG: 2 TABLET ORAL at 10:56

## 2017-09-13 RX ADMIN — ASPIRIN 81 MG CHEWABLE TABLET 81 MG: 81 TABLET CHEWABLE at 10:58

## 2017-09-13 RX ADMIN — ISOSORBIDE DINITRATE 30 MG: 10 TABLET ORAL at 07:03

## 2017-09-13 RX ADMIN — CARVEDILOL 6.25 MG: 6.25 TABLET, FILM COATED ORAL at 10:58

## 2017-09-13 RX ADMIN — SENNOSIDES AND DOCUSATE SODIUM 1 TABLET: 8.6; 5 TABLET ORAL at 21:41

## 2017-09-13 RX ADMIN — CARVEDILOL 6.25 MG: 6.25 TABLET, FILM COATED ORAL at 17:47

## 2017-09-13 RX ADMIN — ISOSORBIDE DINITRATE 30 MG: 10 TABLET ORAL at 21:44

## 2017-09-13 RX ADMIN — VALSARTAN 80 MG: 80 TABLET, FILM COATED ORAL at 10:56

## 2017-09-13 RX ADMIN — HYDRALAZINE HYDROCHLORIDE 25 MG: 25 TABLET, FILM COATED ORAL at 17:47

## 2017-09-13 RX ADMIN — HYDRALAZINE HYDROCHLORIDE 25 MG: 25 TABLET, FILM COATED ORAL at 10:58

## 2017-09-13 RX ADMIN — GLIPIZIDE 2.5 MG: 2.5 TABLET, FILM COATED, EXTENDED RELEASE ORAL at 10:58

## 2017-09-13 RX ADMIN — INSULIN LISPRO 2 UNITS: 100 INJECTION, SOLUTION INTRAVENOUS; SUBCUTANEOUS at 13:53

## 2017-09-13 RX ADMIN — PANTOPRAZOLE SODIUM 40 MG: 40 TABLET, DELAYED RELEASE ORAL at 10:58

## 2017-09-13 RX ADMIN — HEPARIN SODIUM 5000 UNITS: 5000 INJECTION, SOLUTION INTRAVENOUS; SUBCUTANEOUS at 13:53

## 2017-09-13 RX ADMIN — BUMETANIDE 1 MG: 1 TABLET ORAL at 10:56

## 2017-09-13 RX ADMIN — CALCITRIOL 0.25 MCG: 0.25 CAPSULE, LIQUID FILLED ORAL at 10:56

## 2017-09-13 RX ADMIN — DOXAZOSIN 2 MG: 2 TABLET ORAL at 21:41

## 2017-09-13 RX ADMIN — PRAVASTATIN SODIUM 40 MG: 40 TABLET ORAL at 21:41

## 2017-09-14 PROCEDURE — 74011250637 HC RX REV CODE- 250/637: Performed by: PHYSICAL MEDICINE & REHABILITATION

## 2017-09-14 PROCEDURE — 74011250636 HC RX REV CODE- 250/636: Performed by: PHYSICAL MEDICINE & REHABILITATION

## 2017-09-14 RX ADMIN — HEPARIN SODIUM 5000 UNITS: 5000 INJECTION, SOLUTION INTRAVENOUS; SUBCUTANEOUS at 12:44

## 2017-09-14 RX ADMIN — HYDRALAZINE HYDROCHLORIDE 25 MG: 25 TABLET, FILM COATED ORAL at 17:56

## 2017-09-14 RX ADMIN — PRAVASTATIN SODIUM 40 MG: 40 TABLET ORAL at 22:12

## 2017-09-14 RX ADMIN — DOXAZOSIN 2 MG: 2 TABLET ORAL at 22:12

## 2017-09-14 RX ADMIN — CARVEDILOL 6.25 MG: 6.25 TABLET, FILM COATED ORAL at 09:54

## 2017-09-14 RX ADMIN — ISOSORBIDE DINITRATE 30 MG: 10 TABLET ORAL at 22:11

## 2017-09-14 RX ADMIN — HEPARIN SODIUM 5000 UNITS: 5000 INJECTION, SOLUTION INTRAVENOUS; SUBCUTANEOUS at 06:02

## 2017-09-14 RX ADMIN — SENNOSIDES AND DOCUSATE SODIUM 1 TABLET: 8.6; 5 TABLET ORAL at 22:12

## 2017-09-14 RX ADMIN — ISOSORBIDE DINITRATE 30 MG: 10 TABLET ORAL at 14:43

## 2017-09-14 RX ADMIN — ISOSORBIDE DINITRATE 30 MG: 10 TABLET ORAL at 06:06

## 2017-09-14 RX ADMIN — HYDRALAZINE HYDROCHLORIDE 25 MG: 25 TABLET, FILM COATED ORAL at 09:54

## 2017-09-14 RX ADMIN — PANTOPRAZOLE SODIUM 40 MG: 40 TABLET, DELAYED RELEASE ORAL at 09:54

## 2017-09-14 RX ADMIN — HEPARIN SODIUM 5000 UNITS: 5000 INJECTION, SOLUTION INTRAVENOUS; SUBCUTANEOUS at 22:11

## 2017-09-14 RX ADMIN — DOXAZOSIN 1 MG: 2 TABLET ORAL at 09:55

## 2017-09-14 RX ADMIN — BUMETANIDE 1 MG: 1 TABLET ORAL at 09:54

## 2017-09-14 RX ADMIN — CALCITRIOL 0.25 MCG: 0.25 CAPSULE, LIQUID FILLED ORAL at 09:54

## 2017-09-14 RX ADMIN — CARVEDILOL 6.25 MG: 6.25 TABLET, FILM COATED ORAL at 17:56

## 2017-09-14 RX ADMIN — GLIPIZIDE 2.5 MG: 2.5 TABLET, FILM COATED, EXTENDED RELEASE ORAL at 09:54

## 2017-09-14 RX ADMIN — ASPIRIN 81 MG CHEWABLE TABLET 81 MG: 81 TABLET CHEWABLE at 09:54

## 2017-09-14 RX ADMIN — VALSARTAN 80 MG: 80 TABLET, FILM COATED ORAL at 09:54

## 2017-09-14 RX ADMIN — ZOLPIDEM TARTRATE 5 MG: 5 TABLET ORAL at 22:55

## 2017-09-15 LAB
BACTERIA SPEC CULT: NORMAL
CC UR VC: NORMAL
SERVICE CMNT-IMP: NORMAL

## 2017-09-15 PROCEDURE — 74011250636 HC RX REV CODE- 250/636: Performed by: PHYSICAL MEDICINE & REHABILITATION

## 2017-09-15 PROCEDURE — 74011250637 HC RX REV CODE- 250/637: Performed by: PHYSICAL MEDICINE & REHABILITATION

## 2017-09-15 RX ADMIN — VALSARTAN 80 MG: 80 TABLET, FILM COATED ORAL at 08:52

## 2017-09-15 RX ADMIN — SENNOSIDES AND DOCUSATE SODIUM 1 TABLET: 8.6; 5 TABLET ORAL at 22:32

## 2017-09-15 RX ADMIN — ISOSORBIDE DINITRATE 30 MG: 10 TABLET ORAL at 06:50

## 2017-09-15 RX ADMIN — BUMETANIDE 1 MG: 1 TABLET ORAL at 08:52

## 2017-09-15 RX ADMIN — DOXAZOSIN 1 MG: 2 TABLET ORAL at 08:52

## 2017-09-15 RX ADMIN — HEPARIN SODIUM 5000 UNITS: 5000 INJECTION, SOLUTION INTRAVENOUS; SUBCUTANEOUS at 14:59

## 2017-09-15 RX ADMIN — PANTOPRAZOLE SODIUM 40 MG: 40 TABLET, DELAYED RELEASE ORAL at 08:52

## 2017-09-15 RX ADMIN — CARVEDILOL 6.25 MG: 6.25 TABLET, FILM COATED ORAL at 08:52

## 2017-09-15 RX ADMIN — HEPARIN SODIUM 5000 UNITS: 5000 INJECTION, SOLUTION INTRAVENOUS; SUBCUTANEOUS at 06:50

## 2017-09-15 RX ADMIN — ISOSORBIDE DINITRATE 30 MG: 10 TABLET ORAL at 14:59

## 2017-09-15 RX ADMIN — ISOSORBIDE DINITRATE 30 MG: 10 TABLET ORAL at 22:33

## 2017-09-15 RX ADMIN — HEPARIN SODIUM 5000 UNITS: 5000 INJECTION, SOLUTION INTRAVENOUS; SUBCUTANEOUS at 22:32

## 2017-09-15 RX ADMIN — HYDRALAZINE HYDROCHLORIDE 25 MG: 25 TABLET, FILM COATED ORAL at 08:53

## 2017-09-15 RX ADMIN — DOXAZOSIN 2 MG: 2 TABLET ORAL at 22:33

## 2017-09-15 RX ADMIN — HYDRALAZINE HYDROCHLORIDE 25 MG: 25 TABLET, FILM COATED ORAL at 17:50

## 2017-09-15 RX ADMIN — CARVEDILOL 6.25 MG: 6.25 TABLET, FILM COATED ORAL at 17:50

## 2017-09-15 RX ADMIN — PRAVASTATIN SODIUM 40 MG: 40 TABLET ORAL at 22:32

## 2017-09-15 RX ADMIN — ASPIRIN 81 MG CHEWABLE TABLET 81 MG: 81 TABLET CHEWABLE at 08:53

## 2017-09-15 RX ADMIN — CALCITRIOL 0.25 MCG: 0.25 CAPSULE, LIQUID FILLED ORAL at 08:52

## 2017-09-15 RX ADMIN — GLIPIZIDE 2.5 MG: 2.5 TABLET, FILM COATED, EXTENDED RELEASE ORAL at 08:53

## 2017-09-16 PROCEDURE — 74011250637 HC RX REV CODE- 250/637: Performed by: PHYSICAL MEDICINE & REHABILITATION

## 2017-09-16 PROCEDURE — 74011250636 HC RX REV CODE- 250/636: Performed by: PHYSICAL MEDICINE & REHABILITATION

## 2017-09-16 RX ADMIN — ISOSORBIDE DINITRATE 30 MG: 10 TABLET ORAL at 05:21

## 2017-09-16 RX ADMIN — ISOSORBIDE DINITRATE 30 MG: 10 TABLET ORAL at 21:26

## 2017-09-16 RX ADMIN — BUMETANIDE 1 MG: 1 TABLET ORAL at 09:19

## 2017-09-16 RX ADMIN — HEPARIN SODIUM 5000 UNITS: 5000 INJECTION, SOLUTION INTRAVENOUS; SUBCUTANEOUS at 13:04

## 2017-09-16 RX ADMIN — SENNOSIDES AND DOCUSATE SODIUM 1 TABLET: 8.6; 5 TABLET ORAL at 21:26

## 2017-09-16 RX ADMIN — DOXAZOSIN 2 MG: 2 TABLET ORAL at 21:26

## 2017-09-16 RX ADMIN — GLIPIZIDE 2.5 MG: 2.5 TABLET, FILM COATED, EXTENDED RELEASE ORAL at 09:19

## 2017-09-16 RX ADMIN — PRAVASTATIN SODIUM 40 MG: 40 TABLET ORAL at 21:26

## 2017-09-16 RX ADMIN — ISOSORBIDE DINITRATE 30 MG: 10 TABLET ORAL at 13:04

## 2017-09-16 RX ADMIN — HEPARIN SODIUM 5000 UNITS: 5000 INJECTION, SOLUTION INTRAVENOUS; SUBCUTANEOUS at 21:27

## 2017-09-16 RX ADMIN — CALCITRIOL 0.25 MCG: 0.25 CAPSULE, LIQUID FILLED ORAL at 09:18

## 2017-09-16 RX ADMIN — VALSARTAN 80 MG: 80 TABLET, FILM COATED ORAL at 09:19

## 2017-09-16 RX ADMIN — ACETAMINOPHEN 650 MG: 325 TABLET ORAL at 07:06

## 2017-09-16 RX ADMIN — ASPIRIN 81 MG CHEWABLE TABLET 81 MG: 81 TABLET CHEWABLE at 09:20

## 2017-09-16 RX ADMIN — HEPARIN SODIUM 5000 UNITS: 5000 INJECTION, SOLUTION INTRAVENOUS; SUBCUTANEOUS at 05:21

## 2017-09-16 RX ADMIN — HYDRALAZINE HYDROCHLORIDE 25 MG: 25 TABLET, FILM COATED ORAL at 17:30

## 2017-09-16 RX ADMIN — HYDRALAZINE HYDROCHLORIDE 25 MG: 25 TABLET, FILM COATED ORAL at 09:20

## 2017-09-16 RX ADMIN — CARVEDILOL 6.25 MG: 6.25 TABLET, FILM COATED ORAL at 17:30

## 2017-09-16 RX ADMIN — DOXAZOSIN 1 MG: 2 TABLET ORAL at 09:21

## 2017-09-16 RX ADMIN — CARVEDILOL 6.25 MG: 6.25 TABLET, FILM COATED ORAL at 09:19

## 2017-09-16 RX ADMIN — PANTOPRAZOLE SODIUM 40 MG: 40 TABLET, DELAYED RELEASE ORAL at 09:19

## 2017-09-17 PROCEDURE — 74011250637 HC RX REV CODE- 250/637: Performed by: PHYSICAL MEDICINE & REHABILITATION

## 2017-09-17 PROCEDURE — 74011250636 HC RX REV CODE- 250/636: Performed by: PHYSICAL MEDICINE & REHABILITATION

## 2017-09-17 RX ADMIN — HEPARIN SODIUM 5000 UNITS: 5000 INJECTION, SOLUTION INTRAVENOUS; SUBCUTANEOUS at 05:15

## 2017-09-17 RX ADMIN — ASPIRIN 81 MG CHEWABLE TABLET 81 MG: 81 TABLET CHEWABLE at 08:47

## 2017-09-17 RX ADMIN — ISOSORBIDE DINITRATE 30 MG: 10 TABLET ORAL at 05:14

## 2017-09-17 RX ADMIN — CARVEDILOL 6.25 MG: 6.25 TABLET, FILM COATED ORAL at 17:42

## 2017-09-17 RX ADMIN — HEPARIN SODIUM 5000 UNITS: 5000 INJECTION, SOLUTION INTRAVENOUS; SUBCUTANEOUS at 22:25

## 2017-09-17 RX ADMIN — PANTOPRAZOLE SODIUM 40 MG: 40 TABLET, DELAYED RELEASE ORAL at 08:48

## 2017-09-17 RX ADMIN — GLIPIZIDE 2.5 MG: 2.5 TABLET, FILM COATED, EXTENDED RELEASE ORAL at 08:47

## 2017-09-17 RX ADMIN — HYDRALAZINE HYDROCHLORIDE 25 MG: 25 TABLET, FILM COATED ORAL at 08:48

## 2017-09-17 RX ADMIN — ISOSORBIDE DINITRATE 30 MG: 10 TABLET ORAL at 22:27

## 2017-09-17 RX ADMIN — HEPARIN SODIUM 5000 UNITS: 5000 INJECTION, SOLUTION INTRAVENOUS; SUBCUTANEOUS at 14:12

## 2017-09-17 RX ADMIN — ISOSORBIDE DINITRATE 30 MG: 10 TABLET ORAL at 14:12

## 2017-09-17 RX ADMIN — CARVEDILOL 6.25 MG: 6.25 TABLET, FILM COATED ORAL at 08:48

## 2017-09-17 RX ADMIN — BUMETANIDE 1 MG: 1 TABLET ORAL at 08:47

## 2017-09-17 RX ADMIN — DOXAZOSIN 1 MG: 2 TABLET ORAL at 08:47

## 2017-09-17 RX ADMIN — SENNOSIDES AND DOCUSATE SODIUM 1 TABLET: 8.6; 5 TABLET ORAL at 22:25

## 2017-09-17 RX ADMIN — DOXAZOSIN 2 MG: 2 TABLET ORAL at 22:25

## 2017-09-17 RX ADMIN — VALSARTAN 80 MG: 80 TABLET, FILM COATED ORAL at 08:47

## 2017-09-17 RX ADMIN — PRAVASTATIN SODIUM 40 MG: 40 TABLET ORAL at 22:27

## 2017-09-17 RX ADMIN — HYDRALAZINE HYDROCHLORIDE: 25 TABLET, FILM COATED ORAL at 17:42

## 2017-09-17 RX ADMIN — CALCITRIOL 0.25 MCG: 0.25 CAPSULE, LIQUID FILLED ORAL at 08:47

## 2017-09-18 ENCOUNTER — HOSPITAL ENCOUNTER (OUTPATIENT)
Dept: INFUSION THERAPY | Age: 52
Discharge: HOME | End: 2017-09-18
Payer: COMMERCIAL

## 2017-09-18 PROCEDURE — 74011250636 HC RX REV CODE- 250/636: Performed by: PHYSICAL MEDICINE & REHABILITATION

## 2017-09-18 PROCEDURE — 74011250637 HC RX REV CODE- 250/637: Performed by: PHYSICAL MEDICINE & REHABILITATION

## 2017-09-18 RX ADMIN — HEPARIN SODIUM 5000 UNITS: 5000 INJECTION, SOLUTION INTRAVENOUS; SUBCUTANEOUS at 21:45

## 2017-09-18 RX ADMIN — SENNOSIDES AND DOCUSATE SODIUM 1 TABLET: 8.6; 5 TABLET ORAL at 21:45

## 2017-09-18 RX ADMIN — BUMETANIDE 1 MG: 1 TABLET ORAL at 08:27

## 2017-09-18 RX ADMIN — HYDRALAZINE HYDROCHLORIDE 25 MG: 25 TABLET, FILM COATED ORAL at 17:45

## 2017-09-18 RX ADMIN — GLIPIZIDE 2.5 MG: 2.5 TABLET, FILM COATED, EXTENDED RELEASE ORAL at 08:27

## 2017-09-18 RX ADMIN — ISOSORBIDE DINITRATE 30 MG: 10 TABLET ORAL at 05:34

## 2017-09-18 RX ADMIN — ISOSORBIDE DINITRATE 30 MG: 10 TABLET ORAL at 13:21

## 2017-09-18 RX ADMIN — HEPARIN SODIUM 5000 UNITS: 5000 INJECTION, SOLUTION INTRAVENOUS; SUBCUTANEOUS at 13:21

## 2017-09-18 RX ADMIN — PRAVASTATIN SODIUM 40 MG: 40 TABLET ORAL at 21:45

## 2017-09-18 RX ADMIN — HEPARIN SODIUM 5000 UNITS: 5000 INJECTION, SOLUTION INTRAVENOUS; SUBCUTANEOUS at 05:32

## 2017-09-18 RX ADMIN — CARVEDILOL 6.25 MG: 6.25 TABLET, FILM COATED ORAL at 17:45

## 2017-09-18 RX ADMIN — DOXAZOSIN 2 MG: 2 TABLET ORAL at 21:45

## 2017-09-18 RX ADMIN — CALCITRIOL 0.25 MCG: 0.25 CAPSULE, LIQUID FILLED ORAL at 08:27

## 2017-09-18 RX ADMIN — HYDRALAZINE HYDROCHLORIDE 25 MG: 25 TABLET, FILM COATED ORAL at 08:27

## 2017-09-18 RX ADMIN — DOXAZOSIN 1 MG: 2 TABLET ORAL at 08:27

## 2017-09-18 RX ADMIN — VALSARTAN 80 MG: 80 TABLET, FILM COATED ORAL at 08:27

## 2017-09-18 RX ADMIN — PANTOPRAZOLE SODIUM 40 MG: 40 TABLET, DELAYED RELEASE ORAL at 08:27

## 2017-09-18 RX ADMIN — CARVEDILOL 6.25 MG: 6.25 TABLET, FILM COATED ORAL at 08:27

## 2017-09-18 RX ADMIN — ASPIRIN 81 MG CHEWABLE TABLET 81 MG: 81 TABLET CHEWABLE at 08:27

## 2017-09-18 RX ADMIN — ISOSORBIDE DINITRATE 30 MG: 10 TABLET ORAL at 21:46

## 2017-09-18 NOTE — PROGRESS NOTES
Patient did not call or show up for his Procrit appointment today he is in [address] [place] [address]. Please follow up with patient.

## 2017-09-19 ENCOUNTER — APPOINTMENT (OUTPATIENT)
Dept: INFUSION THERAPY | Age: 52
End: 2017-09-19
Payer: COMMERCIAL

## 2017-09-19 PROCEDURE — 74011250636 HC RX REV CODE- 250/636: Performed by: PHYSICAL MEDICINE & REHABILITATION

## 2017-09-19 PROCEDURE — 74011250637 HC RX REV CODE- 250/637: Performed by: PHYSICAL MEDICINE & REHABILITATION

## 2017-09-19 RX ADMIN — ISOSORBIDE DINITRATE 30 MG: 10 TABLET ORAL at 14:42

## 2017-09-19 RX ADMIN — PANTOPRAZOLE SODIUM 40 MG: 40 TABLET, DELAYED RELEASE ORAL at 08:29

## 2017-09-19 RX ADMIN — GLIPIZIDE 2.5 MG: 2.5 TABLET, FILM COATED, EXTENDED RELEASE ORAL at 08:29

## 2017-09-19 RX ADMIN — SENNOSIDES AND DOCUSATE SODIUM 1 TABLET: 8.6; 5 TABLET ORAL at 21:10

## 2017-09-19 RX ADMIN — VALSARTAN 80 MG: 80 TABLET, FILM COATED ORAL at 08:29

## 2017-09-19 RX ADMIN — HYDRALAZINE HYDROCHLORIDE 25 MG: 25 TABLET, FILM COATED ORAL at 19:00

## 2017-09-19 RX ADMIN — BUMETANIDE 1 MG: 1 TABLET ORAL at 08:29

## 2017-09-19 RX ADMIN — DOXAZOSIN 2 MG: 2 TABLET ORAL at 21:11

## 2017-09-19 RX ADMIN — CARVEDILOL 6.25 MG: 6.25 TABLET, FILM COATED ORAL at 08:29

## 2017-09-19 RX ADMIN — DOXAZOSIN 1 MG: 2 TABLET ORAL at 08:30

## 2017-09-19 RX ADMIN — CARVEDILOL 6.25 MG: 6.25 TABLET, FILM COATED ORAL at 18:59

## 2017-09-19 RX ADMIN — HYDRALAZINE HYDROCHLORIDE 25 MG: 25 TABLET, FILM COATED ORAL at 08:30

## 2017-09-19 RX ADMIN — HEPARIN SODIUM 5000 UNITS: 5000 INJECTION, SOLUTION INTRAVENOUS; SUBCUTANEOUS at 06:37

## 2017-09-19 RX ADMIN — ASPIRIN 81 MG CHEWABLE TABLET 81 MG: 81 TABLET CHEWABLE at 08:29

## 2017-09-19 RX ADMIN — CALCITRIOL 0.25 MCG: 0.25 CAPSULE, LIQUID FILLED ORAL at 08:29

## 2017-09-19 RX ADMIN — HEPARIN SODIUM 5000 UNITS: 5000 INJECTION, SOLUTION INTRAVENOUS; SUBCUTANEOUS at 14:42

## 2017-09-19 RX ADMIN — ISOSORBIDE DINITRATE 30 MG: 10 TABLET ORAL at 21:11

## 2017-09-19 RX ADMIN — PRAVASTATIN SODIUM 40 MG: 40 TABLET ORAL at 21:10

## 2017-09-19 RX ADMIN — ISOSORBIDE DINITRATE 30 MG: 10 TABLET ORAL at 06:37

## 2017-09-19 RX ADMIN — HEPARIN SODIUM 5000 UNITS: 5000 INJECTION, SOLUTION INTRAVENOUS; SUBCUTANEOUS at 21:11

## 2017-09-20 PROCEDURE — 74011250637 HC RX REV CODE- 250/637: Performed by: PHYSICAL MEDICINE & REHABILITATION

## 2017-09-20 PROCEDURE — 74011250636 HC RX REV CODE- 250/636: Performed by: PHYSICAL MEDICINE & REHABILITATION

## 2017-09-20 RX ADMIN — GLIPIZIDE 2.5 MG: 2.5 TABLET, FILM COATED, EXTENDED RELEASE ORAL at 10:02

## 2017-09-20 RX ADMIN — DOXAZOSIN 2 MG: 2 TABLET ORAL at 23:33

## 2017-09-20 RX ADMIN — BUMETANIDE 1 MG: 1 TABLET ORAL at 10:02

## 2017-09-20 RX ADMIN — PRAVASTATIN SODIUM 40 MG: 40 TABLET ORAL at 23:33

## 2017-09-20 RX ADMIN — HYDRALAZINE HYDROCHLORIDE 25 MG: 25 TABLET, FILM COATED ORAL at 10:02

## 2017-09-20 RX ADMIN — HEPARIN SODIUM 5000 UNITS: 5000 INJECTION, SOLUTION INTRAVENOUS; SUBCUTANEOUS at 06:40

## 2017-09-20 RX ADMIN — HEPARIN SODIUM 5000 UNITS: 5000 INJECTION, SOLUTION INTRAVENOUS; SUBCUTANEOUS at 13:24

## 2017-09-20 RX ADMIN — HEPARIN SODIUM 5000 UNITS: 5000 INJECTION, SOLUTION INTRAVENOUS; SUBCUTANEOUS at 23:34

## 2017-09-20 RX ADMIN — ISOSORBIDE DINITRATE 30 MG: 10 TABLET ORAL at 13:24

## 2017-09-20 RX ADMIN — ASPIRIN 81 MG CHEWABLE TABLET 81 MG: 81 TABLET CHEWABLE at 10:02

## 2017-09-20 RX ADMIN — VALSARTAN 80 MG: 80 TABLET, FILM COATED ORAL at 10:02

## 2017-09-20 RX ADMIN — CARVEDILOL 6.25 MG: 6.25 TABLET, FILM COATED ORAL at 18:25

## 2017-09-20 RX ADMIN — PANTOPRAZOLE SODIUM 40 MG: 40 TABLET, DELAYED RELEASE ORAL at 10:02

## 2017-09-20 RX ADMIN — ISOSORBIDE DINITRATE 30 MG: 10 TABLET ORAL at 06:40

## 2017-09-20 RX ADMIN — DOXAZOSIN 1 MG: 2 TABLET ORAL at 10:02

## 2017-09-20 RX ADMIN — ISOSORBIDE DINITRATE 30 MG: 10 TABLET ORAL at 23:33

## 2017-09-20 RX ADMIN — SENNOSIDES AND DOCUSATE SODIUM 1 TABLET: 8.6; 5 TABLET ORAL at 23:33

## 2017-09-20 RX ADMIN — HYDRALAZINE HYDROCHLORIDE 25 MG: 25 TABLET, FILM COATED ORAL at 18:25

## 2017-09-20 RX ADMIN — CALCITRIOL 0.25 MCG: 0.25 CAPSULE, LIQUID FILLED ORAL at 10:02

## 2017-09-20 RX ADMIN — CARVEDILOL 6.25 MG: 6.25 TABLET, FILM COATED ORAL at 10:02

## 2017-09-20 NOTE — H&P
[address], [address] [place] [address]  Patient Name:  Yamini Haro Site: [address]   MRN: [id number]     :  1965 Admit Date: 2017   Attending Physician: DR. Tonya Zhang Room: Room 124   Referring Physician:  Joann Diana VCMARIELY        REASON FOR INTENSIVE INPATIENT REHABILITATION AND PRIMARY DIAGNOSIS:    Right parietal left frontal CVA  HISTORY OF PRESENT ILLNESS:     Patient is a 70-year-old male with a history of prior CVAs and residual right hemiparesis systolic heart failure, diabetes, chronic kidney disease stage IV, DVT, who presented to be [address] [place] [address] with altered mentation 2017. He was diagnosed with a left parietal left frontal CVA by MRI. He was placed on aspirin Pravachol transferred to [facility] for intensive inpatient rehab and continue medical management  ALLERGIES: NKDA    PAST MEDICAL HISTORY:   Multiple CVAs,  Chronic systolic heart failure  Type 2 diabetes with hyperglycemia  Kidney disease stage IV  DVT  Specificity  Neurogenic bladder requiring intermittent  Multiple CVAs  PTSD    PAST SURGICAL HISTORY:   None  FAMILY HISTORY:    Noncontributory  SOCIAL HISTORY:    Patient lives with a girlfriend and son in a two-level home.   He was working as a   HOME MEDICATIONS:   Isordil 30 3 times daily  Aspirin 81 daily  Bumex 1 mg daily  Coreg 25 twice daily  Hydralazine 25 3 times daily  Losartan 50 daily  There is a question of his compliance with these medications    MEDICATIONS ON TRANSFER:    Aspirin 81 mg p.o. daily  Bumex 1 mg p.o. daily  Rocaltrol 0.25 mcg p.o. daily  Coreg 6.25 twice daily  Cardura 1 mg in the morning 2 mg in the evening  Glucotrol XL 2.5 daily  Hydralazine 25 twice daily  Isordil 30 3 times daily  Cozaar 50 daily  Protonix 40 daily  Pravachol 40 nightly  Subcu heparin 5000 every 8    REVIEW OF SYSTEMS:   Respiratory: denies shortness of breath  Cardiovascular: denies chest pain  Gastrointestinal: denies nausea, vomiting  Genitourinary: denies for dysuria, or urinary incontinence  Musculoskeletal:    Neurological: denies paresthesia or dysesthesias  Skin: denies new rashes, erythema, or edema     PRE-MORBID FUNCTION:  Independent with feeding grooming ADLs and transfers. CURRENT FUNCTION:   Moderate assist for basic self-care skills and mobility and max assist with attention  Patient 5 feet rolling walker max assist        GENERAL MULTI-SYSTEM EXAM:  Vitals: Blood pressure 150/87  Pulse 75, 98, respirations 16, sat 97%  Constitutional: No Acute Distress  Psych: cooperative throughout the examination. Cardiovascular: Regular rate and rhythm no murmur  Respiratory:   Clear to auscultation  Skin: Intact  EXT: No edema  NEURO: Left neglect  Significant cognitive deficits  Receptive aphasia  Severe dysmetria noted on the left than the right greater than left  Significant ataxia and apraxia on the right side  Strength both all 4 extremities    ASSESSMENT AND PLAN:  Impairment/Disability due left parietal left frontal CVAs  This patient is able to participate in and benefit from the below stated rehab plan.     CO-MORBID CONDITIONS:     Co-morbid conditions requires 24 hour rehab nursing and daily face-to-face physician oversight and management plan  Multiple CVAs  Systolic chronic heart failure  Type 2 diabetes with hyperglycemia: Glucotrol monitor blood sugars sliding scale insulin  History of DVT  : Subcu heparin chronic heart failure  Monitor weight, continue Bumex  Secondary CVA prophylaxis aspirin, Pravachol  DVT prophylaxis: Subcu heparin  Neurogenic bladder continue IC  Spasticity consider Zanaflex  Hypertension: Hydralazine Cozaar Cardura Coreg, monitor closely with return to activity and exercise    INITIAL PLAN OF CARE:  Rehabilitation Potential is  to make improvements and achieve overall functional goal of    Min assist overall basic self-care skills and mobility  ELOS 3 weeks   discharge disposition home with family  Precautions/Restrictions:   None    Specific rehab needs: The above care plan has been developed in accordance with multiple therapy disciplines including the following interdisciplinary team specialties. <SPECIFIC REHABILITATION NEEDS:>  1. PT 1 to 2 hours a day, 6 days a week to improve bed mobility, gait, functional transfers, mobility, and strengthening. 2. OT 1 to 2 hours a day, 6 days a week to improve basic self-care and functional transfers, adaptive equipment procurement and training. 3. Nursing 24-hours a day by a nurse specialized in rehabilitation for medication administration, skin infection prevention, wound care, bowel and bladder management, pain control, and vital signs monitoring. 4. Case management 1 hour a day, 3 days a week for discharge planning and team coordination. <POST-ADMISSION PHYSICIAN EVALUATION:>Due to the rigors of the rehabilitation program and methods to monitor, prevent, and treat them are noted in the comorbidities. POST-ADMISSION PHYSICAL EVALUATION:  The potential medical and rehab complications due to the patients medical co morbidities which exist for this patient due to the rigors of the intensive rehabilitation program, and the methods to monitor and prevent them are noted in the above initial plan of care. Comparison to Preadmission Screening  This patients current function and medical condition compared with the preadmission screening form: SAME         <SUMMARY STATEMENTS:>An IRF setting is deemed the most appropriate level of care for this patient based upon the needs of the above noted conditions and co-morbidities.   1. This patient requires at least 3 hours daily therapies at least 5 days per week (or a minimum of 15 hours/week) to be provided continuously, increase endurance and reinforce learning of adaptive techniques due to a combination of functional deficits and co-morbidities noted above. 2. In addition, coordinated weekly team meetings will be utilized throughout this patients rehab stay, to review progress, identify problems and determine solutions to these barriers within previously set goals. Revisions to goals and care plan will be discussed whenever necessary with physician, nursing, and therapy input. <POST-ADMISSION PHYSICAL EVALUATION:>  The existing and potential medical and rehab complications due to the patients medical co morbidities which exist for this patient due to the rigors of the intensive rehabilitation program, and the methods to monitor and prevent them are noted in the above initial plan of care.            Mitra Blair M.D.   Kai Borjas  09/13/2017 6AM

## 2017-09-21 PROCEDURE — 74011250637 HC RX REV CODE- 250/637: Performed by: PHYSICAL MEDICINE & REHABILITATION

## 2017-09-21 PROCEDURE — 74011250636 HC RX REV CODE- 250/636: Performed by: PHYSICAL MEDICINE & REHABILITATION

## 2017-09-21 RX ADMIN — CALCITRIOL 0.25 MCG: 0.25 CAPSULE, LIQUID FILLED ORAL at 08:47

## 2017-09-21 RX ADMIN — CARVEDILOL 6.25 MG: 6.25 TABLET, FILM COATED ORAL at 08:48

## 2017-09-21 RX ADMIN — BUMETANIDE 1 MG: 1 TABLET ORAL at 08:46

## 2017-09-21 RX ADMIN — DOXAZOSIN 1 MG: 2 TABLET ORAL at 08:47

## 2017-09-21 RX ADMIN — ASPIRIN 81 MG CHEWABLE TABLET 81 MG: 81 TABLET CHEWABLE at 08:47

## 2017-09-21 RX ADMIN — HEPARIN SODIUM 5000 UNITS: 5000 INJECTION, SOLUTION INTRAVENOUS; SUBCUTANEOUS at 21:25

## 2017-09-21 RX ADMIN — ISOSORBIDE DINITRATE 30 MG: 10 TABLET ORAL at 05:31

## 2017-09-21 RX ADMIN — ISOSORBIDE DINITRATE 30 MG: 10 TABLET ORAL at 13:59

## 2017-09-21 RX ADMIN — HYDRALAZINE HYDROCHLORIDE 25 MG: 25 TABLET, FILM COATED ORAL at 08:47

## 2017-09-21 RX ADMIN — GLIPIZIDE 2.5 MG: 2.5 TABLET, FILM COATED, EXTENDED RELEASE ORAL at 08:48

## 2017-09-21 RX ADMIN — PANTOPRAZOLE SODIUM 40 MG: 40 TABLET, DELAYED RELEASE ORAL at 08:48

## 2017-09-21 RX ADMIN — SENNOSIDES AND DOCUSATE SODIUM 1 TABLET: 8.6; 5 TABLET ORAL at 21:26

## 2017-09-21 RX ADMIN — PRAVASTATIN SODIUM 40 MG: 40 TABLET ORAL at 21:26

## 2017-09-21 RX ADMIN — HEPARIN SODIUM 5000 UNITS: 5000 INJECTION, SOLUTION INTRAVENOUS; SUBCUTANEOUS at 05:31

## 2017-09-21 RX ADMIN — HEPARIN SODIUM 5000 UNITS: 5000 INJECTION, SOLUTION INTRAVENOUS; SUBCUTANEOUS at 12:49

## 2017-09-21 RX ADMIN — DOXAZOSIN 2 MG: 2 TABLET ORAL at 21:26

## 2017-09-21 RX ADMIN — VALSARTAN 80 MG: 80 TABLET, FILM COATED ORAL at 08:47

## 2017-09-21 RX ADMIN — ISOSORBIDE DINITRATE 30 MG: 10 TABLET ORAL at 21:25

## 2017-09-21 RX ADMIN — CARVEDILOL 6.25 MG: 6.25 TABLET, FILM COATED ORAL at 17:48

## 2017-09-21 RX ADMIN — HYDRALAZINE HYDROCHLORIDE 25 MG: 25 TABLET, FILM COATED ORAL at 17:48

## 2017-09-22 PROCEDURE — 74011250637 HC RX REV CODE- 250/637: Performed by: PHYSICAL MEDICINE & REHABILITATION

## 2017-09-22 PROCEDURE — 74011250636 HC RX REV CODE- 250/636: Performed by: PHYSICAL MEDICINE & REHABILITATION

## 2017-09-22 RX ADMIN — CARVEDILOL 6.25 MG: 6.25 TABLET, FILM COATED ORAL at 09:22

## 2017-09-22 RX ADMIN — CARVEDILOL 6.25 MG: 6.25 TABLET, FILM COATED ORAL at 17:59

## 2017-09-22 RX ADMIN — CALCITRIOL 0.25 MCG: 0.25 CAPSULE, LIQUID FILLED ORAL at 09:23

## 2017-09-22 RX ADMIN — ASPIRIN 81 MG CHEWABLE TABLET 81 MG: 81 TABLET CHEWABLE at 09:21

## 2017-09-22 RX ADMIN — GLIPIZIDE 2.5 MG: 2.5 TABLET, FILM COATED, EXTENDED RELEASE ORAL at 09:24

## 2017-09-22 RX ADMIN — BUMETANIDE 1 MG: 1 TABLET ORAL at 09:23

## 2017-09-22 RX ADMIN — PANTOPRAZOLE SODIUM 40 MG: 40 TABLET, DELAYED RELEASE ORAL at 09:23

## 2017-09-22 RX ADMIN — HYDRALAZINE HYDROCHLORIDE 25 MG: 25 TABLET, FILM COATED ORAL at 17:59

## 2017-09-22 RX ADMIN — ISOSORBIDE DINITRATE 30 MG: 10 TABLET ORAL at 05:23

## 2017-09-22 RX ADMIN — HEPARIN SODIUM 5000 UNITS: 5000 INJECTION, SOLUTION INTRAVENOUS; SUBCUTANEOUS at 13:26

## 2017-09-22 RX ADMIN — DOXAZOSIN 2 MG: 2 TABLET ORAL at 21:47

## 2017-09-22 RX ADMIN — HEPARIN SODIUM 5000 UNITS: 5000 INJECTION, SOLUTION INTRAVENOUS; SUBCUTANEOUS at 21:48

## 2017-09-22 RX ADMIN — PRAVASTATIN SODIUM 40 MG: 40 TABLET ORAL at 21:48

## 2017-09-22 RX ADMIN — ISOSORBIDE DINITRATE 30 MG: 10 TABLET ORAL at 21:40

## 2017-09-22 RX ADMIN — HYDRALAZINE HYDROCHLORIDE 25 MG: 25 TABLET, FILM COATED ORAL at 09:22

## 2017-09-22 RX ADMIN — SENNOSIDES AND DOCUSATE SODIUM 1 TABLET: 8.6; 5 TABLET ORAL at 21:39

## 2017-09-22 RX ADMIN — VALSARTAN 80 MG: 80 TABLET, FILM COATED ORAL at 09:22

## 2017-09-22 RX ADMIN — DOXAZOSIN 1 MG: 2 TABLET ORAL at 09:23

## 2017-09-22 RX ADMIN — HEPARIN SODIUM 5000 UNITS: 5000 INJECTION, SOLUTION INTRAVENOUS; SUBCUTANEOUS at 05:25

## 2017-09-23 PROCEDURE — 74011250636 HC RX REV CODE- 250/636: Performed by: PHYSICAL MEDICINE & REHABILITATION

## 2017-09-23 PROCEDURE — 74011250637 HC RX REV CODE- 250/637: Performed by: PHYSICAL MEDICINE & REHABILITATION

## 2017-09-23 RX ADMIN — PRAVASTATIN SODIUM 40 MG: 40 TABLET ORAL at 21:05

## 2017-09-23 RX ADMIN — ISOSORBIDE DINITRATE 30 MG: 10 TABLET ORAL at 05:22

## 2017-09-23 RX ADMIN — CALCITRIOL 0.25 MCG: 0.25 CAPSULE, LIQUID FILLED ORAL at 09:09

## 2017-09-23 RX ADMIN — GLIPIZIDE 2.5 MG: 2.5 TABLET, FILM COATED, EXTENDED RELEASE ORAL at 09:09

## 2017-09-23 RX ADMIN — ASPIRIN 81 MG CHEWABLE TABLET 81 MG: 81 TABLET CHEWABLE at 09:09

## 2017-09-23 RX ADMIN — BUMETANIDE 1 MG: 1 TABLET ORAL at 09:09

## 2017-09-23 RX ADMIN — CARVEDILOL 6.25 MG: 6.25 TABLET, FILM COATED ORAL at 17:10

## 2017-09-23 RX ADMIN — DOXAZOSIN 2 MG: 2 TABLET ORAL at 21:05

## 2017-09-23 RX ADMIN — HEPARIN SODIUM 5000 UNITS: 5000 INJECTION, SOLUTION INTRAVENOUS; SUBCUTANEOUS at 14:10

## 2017-09-23 RX ADMIN — ISOSORBIDE DINITRATE 30 MG: 10 TABLET ORAL at 15:47

## 2017-09-23 RX ADMIN — VALSARTAN 80 MG: 80 TABLET, FILM COATED ORAL at 09:09

## 2017-09-23 RX ADMIN — CARVEDILOL 6.25 MG: 6.25 TABLET, FILM COATED ORAL at 09:09

## 2017-09-23 RX ADMIN — HYDRALAZINE HYDROCHLORIDE 25 MG: 25 TABLET, FILM COATED ORAL at 17:11

## 2017-09-23 RX ADMIN — SENNOSIDES AND DOCUSATE SODIUM 1 TABLET: 8.6; 5 TABLET ORAL at 21:05

## 2017-09-23 RX ADMIN — ISOSORBIDE DINITRATE 30 MG: 10 TABLET ORAL at 21:04

## 2017-09-23 RX ADMIN — PANTOPRAZOLE SODIUM 40 MG: 40 TABLET, DELAYED RELEASE ORAL at 09:09

## 2017-09-23 RX ADMIN — HEPARIN SODIUM 5000 UNITS: 5000 INJECTION, SOLUTION INTRAVENOUS; SUBCUTANEOUS at 21:00

## 2017-09-23 RX ADMIN — DOXAZOSIN 1 MG: 2 TABLET ORAL at 09:09

## 2017-09-23 RX ADMIN — HEPARIN SODIUM 5000 UNITS: 5000 INJECTION, SOLUTION INTRAVENOUS; SUBCUTANEOUS at 05:22

## 2017-09-23 RX ADMIN — HYDRALAZINE HYDROCHLORIDE 25 MG: 25 TABLET, FILM COATED ORAL at 09:09

## 2017-09-24 PROCEDURE — 74011250637 HC RX REV CODE- 250/637: Performed by: PHYSICAL MEDICINE & REHABILITATION

## 2017-09-24 PROCEDURE — 74011250636 HC RX REV CODE- 250/636: Performed by: PHYSICAL MEDICINE & REHABILITATION

## 2017-09-24 RX ORDER — SWAB
1 SWAB, NON-MEDICATED MISCELLANEOUS DAILY
Status: DISCONTINUED | OUTPATIENT
Start: 2017-09-24 | End: 2017-09-27 | Stop reason: HOSPADM

## 2017-09-24 RX ADMIN — HYDRALAZINE HYDROCHLORIDE 25 MG: 25 TABLET, FILM COATED ORAL at 17:01

## 2017-09-24 RX ADMIN — CARVEDILOL 6.25 MG: 6.25 TABLET, FILM COATED ORAL at 09:00

## 2017-09-24 RX ADMIN — GLIPIZIDE 2.5 MG: 2.5 TABLET, FILM COATED, EXTENDED RELEASE ORAL at 08:00

## 2017-09-24 RX ADMIN — CALCITRIOL 0.25 MCG: 0.25 CAPSULE, LIQUID FILLED ORAL at 09:00

## 2017-09-24 RX ADMIN — BUMETANIDE 1 MG: 1 TABLET ORAL at 09:00

## 2017-09-24 RX ADMIN — SENNOSIDES AND DOCUSATE SODIUM 1 TABLET: 8.6; 5 TABLET ORAL at 21:15

## 2017-09-24 RX ADMIN — HEPARIN SODIUM 5000 UNITS: 5000 INJECTION, SOLUTION INTRAVENOUS; SUBCUTANEOUS at 13:09

## 2017-09-24 RX ADMIN — CARVEDILOL 6.25 MG: 6.25 TABLET, FILM COATED ORAL at 17:01

## 2017-09-24 RX ADMIN — PRAVASTATIN SODIUM 40 MG: 40 TABLET ORAL at 21:15

## 2017-09-24 RX ADMIN — ISOSORBIDE DINITRATE 30 MG: 10 TABLET ORAL at 13:09

## 2017-09-24 RX ADMIN — DOXAZOSIN 2 MG: 2 TABLET ORAL at 21:15

## 2017-09-24 RX ADMIN — Medication 1 TABLET: at 13:09

## 2017-09-24 RX ADMIN — PANTOPRAZOLE SODIUM 40 MG: 40 TABLET, DELAYED RELEASE ORAL at 08:34

## 2017-09-24 RX ADMIN — MAGNESIUM HYDROXIDE 30 ML: 400 SUSPENSION ORAL at 17:02

## 2017-09-24 RX ADMIN — HEPARIN SODIUM 5000 UNITS: 5000 INJECTION, SOLUTION INTRAVENOUS; SUBCUTANEOUS at 21:14

## 2017-09-24 RX ADMIN — HYDRALAZINE HYDROCHLORIDE 25 MG: 25 TABLET, FILM COATED ORAL at 09:00

## 2017-09-24 RX ADMIN — HEPARIN SODIUM 5000 UNITS: 5000 INJECTION, SOLUTION INTRAVENOUS; SUBCUTANEOUS at 05:07

## 2017-09-24 RX ADMIN — VALSARTAN 80 MG: 80 TABLET, FILM COATED ORAL at 09:00

## 2017-09-24 RX ADMIN — DOXAZOSIN 1 MG: 2 TABLET ORAL at 08:35

## 2017-09-24 RX ADMIN — ASPIRIN 81 MG CHEWABLE TABLET 81 MG: 81 TABLET CHEWABLE at 09:00

## 2017-09-24 RX ADMIN — ISOSORBIDE DINITRATE 30 MG: 10 TABLET ORAL at 05:07

## 2017-09-24 RX ADMIN — ISOSORBIDE DINITRATE 30 MG: 10 TABLET ORAL at 21:14

## 2017-09-25 LAB
ALBUMIN SERPL-MCNC: 3.5 G/DL (ref 3.5–5)
ALBUMIN/GLOB SERPL: 0.7 {RATIO} (ref 1.1–2.2)
ALP SERPL-CCNC: 86 U/L (ref 45–117)
ALT SERPL-CCNC: 9 U/L (ref 12–78)
ANION GAP SERPL CALC-SCNC: 11 MMOL/L (ref 5–15)
AST SERPL-CCNC: 8 U/L (ref 15–37)
BILIRUB SERPL-MCNC: 0.6 MG/DL (ref 0.2–1)
BUN SERPL-MCNC: 96 MG/DL (ref 6–20)
BUN/CREAT SERPL: 23 (ref 12–20)
CALCIUM SERPL-MCNC: 9.6 MG/DL (ref 8.5–10.1)
CHLORIDE SERPL-SCNC: 106 MMOL/L (ref 97–108)
CO2 SERPL-SCNC: 20 MMOL/L (ref 21–32)
CREAT SERPL-MCNC: 4.23 MG/DL (ref 0.7–1.3)
ERYTHROCYTE [DISTWIDTH] IN BLOOD BY AUTOMATED COUNT: 14.9 % (ref 11.5–14.5)
GLOBULIN SER CALC-MCNC: 5 G/DL (ref 2–4)
GLUCOSE SERPL-MCNC: 89 MG/DL (ref 65–100)
HCT VFR BLD AUTO: 31 % (ref 36.6–50.3)
HGB BLD-MCNC: 10.1 G/DL (ref 12.1–17)
MCH RBC QN AUTO: 27.2 PG (ref 26–34)
MCHC RBC AUTO-ENTMCNC: 32.6 G/DL (ref 30–36.5)
MCV RBC AUTO: 83.3 FL (ref 80–99)
PLATELET # BLD AUTO: 388 K/UL (ref 150–400)
POTASSIUM SERPL-SCNC: 5.7 MMOL/L (ref 3.5–5.1)
PROT SERPL-MCNC: 8.5 G/DL (ref 6.4–8.2)
RBC # BLD AUTO: 3.72 M/UL (ref 4.1–5.7)
SODIUM SERPL-SCNC: 137 MMOL/L (ref 136–145)
WBC # BLD AUTO: 6.2 K/UL (ref 4.1–11.1)

## 2017-09-25 PROCEDURE — 80053 COMPREHEN METABOLIC PANEL: CPT | Performed by: PHYSICAL MEDICINE & REHABILITATION

## 2017-09-25 PROCEDURE — 85027 COMPLETE CBC AUTOMATED: CPT | Performed by: PHYSICAL MEDICINE & REHABILITATION

## 2017-09-25 PROCEDURE — 74011250636 HC RX REV CODE- 250/636: Performed by: PHYSICAL MEDICINE & REHABILITATION

## 2017-09-25 PROCEDURE — 36415 COLL VENOUS BLD VENIPUNCTURE: CPT | Performed by: PHYSICAL MEDICINE & REHABILITATION

## 2017-09-25 PROCEDURE — 74011250637 HC RX REV CODE- 250/637: Performed by: PHYSICAL MEDICINE & REHABILITATION

## 2017-09-25 RX ORDER — INSULIN LISPRO 100 [IU]/ML
INJECTION, SOLUTION INTRAVENOUS; SUBCUTANEOUS
Status: DISCONTINUED | OUTPATIENT
Start: 2017-09-25 | End: 2017-09-27 | Stop reason: HOSPADM

## 2017-09-25 RX ADMIN — VALSARTAN 80 MG: 80 TABLET, FILM COATED ORAL at 08:23

## 2017-09-25 RX ADMIN — CALCITRIOL 0.25 MCG: 0.25 CAPSULE, LIQUID FILLED ORAL at 08:23

## 2017-09-25 RX ADMIN — DOXAZOSIN 1 MG: 2 TABLET ORAL at 08:24

## 2017-09-25 RX ADMIN — ISOSORBIDE DINITRATE 30 MG: 10 TABLET ORAL at 21:17

## 2017-09-25 RX ADMIN — ISOSORBIDE DINITRATE 30 MG: 10 TABLET ORAL at 13:33

## 2017-09-25 RX ADMIN — HEPARIN SODIUM 5000 UNITS: 5000 INJECTION, SOLUTION INTRAVENOUS; SUBCUTANEOUS at 21:14

## 2017-09-25 RX ADMIN — SENNOSIDES AND DOCUSATE SODIUM 1 TABLET: 8.6; 5 TABLET ORAL at 21:14

## 2017-09-25 RX ADMIN — CARVEDILOL 6.25 MG: 6.25 TABLET, FILM COATED ORAL at 16:29

## 2017-09-25 RX ADMIN — PRAVASTATIN SODIUM 40 MG: 40 TABLET ORAL at 21:14

## 2017-09-25 RX ADMIN — HEPARIN SODIUM 5000 UNITS: 5000 INJECTION, SOLUTION INTRAVENOUS; SUBCUTANEOUS at 05:51

## 2017-09-25 RX ADMIN — BUMETANIDE 1 MG: 1 TABLET ORAL at 08:23

## 2017-09-25 RX ADMIN — CARVEDILOL 6.25 MG: 6.25 TABLET, FILM COATED ORAL at 08:23

## 2017-09-25 RX ADMIN — PANTOPRAZOLE SODIUM 40 MG: 40 TABLET, DELAYED RELEASE ORAL at 08:23

## 2017-09-25 RX ADMIN — ASPIRIN 81 MG CHEWABLE TABLET 81 MG: 81 TABLET CHEWABLE at 08:24

## 2017-09-25 RX ADMIN — HEPARIN SODIUM 5000 UNITS: 5000 INJECTION, SOLUTION INTRAVENOUS; SUBCUTANEOUS at 13:33

## 2017-09-25 RX ADMIN — HYDRALAZINE HYDROCHLORIDE 25 MG: 25 TABLET, FILM COATED ORAL at 16:30

## 2017-09-25 RX ADMIN — ISOSORBIDE DINITRATE 30 MG: 10 TABLET ORAL at 05:51

## 2017-09-25 RX ADMIN — GLIPIZIDE 2.5 MG: 2.5 TABLET, FILM COATED, EXTENDED RELEASE ORAL at 08:23

## 2017-09-25 RX ADMIN — Medication 1 TABLET: at 08:23

## 2017-09-25 RX ADMIN — HYDRALAZINE HYDROCHLORIDE 25 MG: 25 TABLET, FILM COATED ORAL at 08:23

## 2017-09-25 RX ADMIN — DOXAZOSIN 2 MG: 2 TABLET ORAL at 21:15

## 2017-09-26 PROCEDURE — 74011250636 HC RX REV CODE- 250/636: Performed by: PHYSICAL MEDICINE & REHABILITATION

## 2017-09-26 PROCEDURE — 74011250637 HC RX REV CODE- 250/637: Performed by: PHYSICAL MEDICINE & REHABILITATION

## 2017-09-26 RX ADMIN — HEPARIN SODIUM 5000 UNITS: 5000 INJECTION, SOLUTION INTRAVENOUS; SUBCUTANEOUS at 13:10

## 2017-09-26 RX ADMIN — HYDRALAZINE HYDROCHLORIDE 25 MG: 25 TABLET, FILM COATED ORAL at 17:50

## 2017-09-26 RX ADMIN — ISOSORBIDE DINITRATE 30 MG: 10 TABLET ORAL at 21:49

## 2017-09-26 RX ADMIN — DOXAZOSIN 2 MG: 2 TABLET ORAL at 21:50

## 2017-09-26 RX ADMIN — MAGNESIUM HYDROXIDE 30 ML: 400 SUSPENSION ORAL at 02:36

## 2017-09-26 RX ADMIN — PRAVASTATIN SODIUM 40 MG: 40 TABLET ORAL at 21:50

## 2017-09-26 RX ADMIN — ISOSORBIDE DINITRATE 30 MG: 10 TABLET ORAL at 13:09

## 2017-09-26 RX ADMIN — HEPARIN SODIUM 5000 UNITS: 5000 INJECTION, SOLUTION INTRAVENOUS; SUBCUTANEOUS at 05:16

## 2017-09-26 RX ADMIN — PANTOPRAZOLE SODIUM 40 MG: 40 TABLET, DELAYED RELEASE ORAL at 08:03

## 2017-09-26 RX ADMIN — DOXAZOSIN 1 MG: 2 TABLET ORAL at 08:03

## 2017-09-26 RX ADMIN — BUMETANIDE 1 MG: 1 TABLET ORAL at 08:03

## 2017-09-26 RX ADMIN — ISOSORBIDE DINITRATE 30 MG: 10 TABLET ORAL at 05:15

## 2017-09-26 RX ADMIN — CARVEDILOL 6.25 MG: 6.25 TABLET, FILM COATED ORAL at 08:02

## 2017-09-26 RX ADMIN — CARVEDILOL 6.25 MG: 6.25 TABLET, FILM COATED ORAL at 17:51

## 2017-09-26 RX ADMIN — GLIPIZIDE 2.5 MG: 2.5 TABLET, FILM COATED, EXTENDED RELEASE ORAL at 08:03

## 2017-09-26 RX ADMIN — VALSARTAN 80 MG: 80 TABLET, FILM COATED ORAL at 08:02

## 2017-09-26 RX ADMIN — SENNOSIDES AND DOCUSATE SODIUM 1 TABLET: 8.6; 5 TABLET ORAL at 21:50

## 2017-09-26 RX ADMIN — Medication 1 TABLET: at 08:03

## 2017-09-26 RX ADMIN — HYDRALAZINE HYDROCHLORIDE 25 MG: 25 TABLET, FILM COATED ORAL at 08:03

## 2017-09-26 RX ADMIN — WARFARIN SODIUM 3 MG: 1 TABLET ORAL at 17:51

## 2017-09-26 RX ADMIN — HEPARIN SODIUM 5000 UNITS: 5000 INJECTION, SOLUTION INTRAVENOUS; SUBCUTANEOUS at 21:50

## 2017-09-26 RX ADMIN — ASPIRIN 81 MG CHEWABLE TABLET 81 MG: 81 TABLET CHEWABLE at 08:02

## 2017-09-26 RX ADMIN — CALCITRIOL 0.25 MCG: 0.25 CAPSULE, LIQUID FILLED ORAL at 08:02

## 2017-09-27 VITALS
BODY MASS INDEX: 27.16 KG/M2 | HEART RATE: 75 BPM | HEIGHT: 71 IN | SYSTOLIC BLOOD PRESSURE: 131 MMHG | WEIGHT: 194 LBS | DIASTOLIC BLOOD PRESSURE: 65 MMHG

## 2017-09-27 PROCEDURE — 74011250636 HC RX REV CODE- 250/636: Performed by: PHYSICAL MEDICINE & REHABILITATION

## 2017-09-27 PROCEDURE — 74011250637 HC RX REV CODE- 250/637: Performed by: PHYSICAL MEDICINE & REHABILITATION

## 2017-09-27 RX ADMIN — CALCITRIOL 0.25 MCG: 0.25 CAPSULE, LIQUID FILLED ORAL at 08:28

## 2017-09-27 RX ADMIN — ISOSORBIDE DINITRATE 30 MG: 10 TABLET ORAL at 05:36

## 2017-09-27 RX ADMIN — Medication 1 TABLET: at 08:27

## 2017-09-27 RX ADMIN — BUMETANIDE 1 MG: 1 TABLET ORAL at 08:28

## 2017-09-27 RX ADMIN — HEPARIN SODIUM 5000 UNITS: 5000 INJECTION, SOLUTION INTRAVENOUS; SUBCUTANEOUS at 05:36

## 2017-09-27 RX ADMIN — PANTOPRAZOLE SODIUM 40 MG: 40 TABLET, DELAYED RELEASE ORAL at 08:27

## 2017-09-27 RX ADMIN — CARVEDILOL 6.25 MG: 6.25 TABLET, FILM COATED ORAL at 08:28

## 2017-09-27 RX ADMIN — ASPIRIN 81 MG CHEWABLE TABLET 81 MG: 81 TABLET CHEWABLE at 08:28

## 2017-09-27 RX ADMIN — GLIPIZIDE 2.5 MG: 2.5 TABLET, FILM COATED, EXTENDED RELEASE ORAL at 08:00

## 2017-09-27 RX ADMIN — HYDRALAZINE HYDROCHLORIDE 25 MG: 25 TABLET, FILM COATED ORAL at 08:28

## 2017-09-27 RX ADMIN — VALSARTAN 80 MG: 80 TABLET, FILM COATED ORAL at 08:27

## 2017-09-27 RX ADMIN — DOXAZOSIN 1 MG: 2 TABLET ORAL at 08:28

## 2017-09-28 ENCOUNTER — HOSPITAL ENCOUNTER (OUTPATIENT)
Dept: INFUSION THERAPY | Age: 52
Discharge: HOME | End: 2017-09-28
Payer: COMMERCIAL

## 2017-09-28 VITALS
HEART RATE: 83 BPM | RESPIRATION RATE: 18 BRPM | TEMPERATURE: 98.4 F | OXYGEN SATURATION: 97 % | DIASTOLIC BLOOD PRESSURE: 67 MMHG | SYSTOLIC BLOOD PRESSURE: 121 MMHG

## 2017-09-28 PROCEDURE — 74011250636 HC RX REV CODE- 250/636: Performed by: INTERNAL MEDICINE

## 2017-09-28 PROCEDURE — 96372 THER/PROPH/DIAG INJ SC/IM: CPT

## 2017-09-28 RX ORDER — CARVEDILOL 6.25 MG/1
TABLET ORAL
Qty: 180 TAB | Refills: 1 | Status: SHIPPED | OUTPATIENT
Start: 2017-09-28 | End: 2017-10-04 | Stop reason: SDUPTHER

## 2017-09-28 RX ADMIN — ERYTHROPOIETIN 40000 UNITS: 40000 INJECTION, SOLUTION INTRAVENOUS; SUBCUTANEOUS at 16:11

## 2017-09-28 NOTE — TELEPHONE ENCOUNTER
#[phone] Yamile Flanagan is asking if you can do this so pt doesn't have to pay again? Pt had a stroke while in truck and when ambulance took him they didn't get his medication. The medication is in [place] in the truck. Please issue a new script she is asking.

## 2017-10-02 ENCOUNTER — APPOINTMENT (OUTPATIENT)
Dept: INFUSION THERAPY | Age: 52
End: 2017-10-02
Payer: COMMERCIAL

## 2017-10-16 ENCOUNTER — APPOINTMENT (OUTPATIENT)
Dept: INFUSION THERAPY | Age: 52
End: 2017-10-16

## 2017-10-17 ENCOUNTER — APPOINTMENT (OUTPATIENT)
Dept: INFUSION THERAPY | Age: 52
End: 2017-10-17

## 2017-10-23 ENCOUNTER — OFFICE VISIT (OUTPATIENT)
Dept: INTERNAL MEDICINE CLINIC | Age: 52
End: 2017-10-23

## 2017-10-23 VITALS
HEART RATE: 74 BPM | DIASTOLIC BLOOD PRESSURE: 74 MMHG | WEIGHT: 199.5 LBS | SYSTOLIC BLOOD PRESSURE: 155 MMHG | BODY MASS INDEX: 27.93 KG/M2 | OXYGEN SATURATION: 99 % | HEIGHT: 71 IN | RESPIRATION RATE: 18 BRPM | TEMPERATURE: 98.4 F

## 2017-10-23 DIAGNOSIS — N18.4 TYPE 2 DIABETES MELLITUS WITH STAGE 4 CHRONIC KIDNEY DISEASE, WITHOUT LONG-TERM CURRENT USE OF INSULIN (HCC): Primary | Chronic | ICD-10-CM

## 2017-10-23 DIAGNOSIS — I50.32 DIASTOLIC CHF, CHRONIC (HCC): Chronic | ICD-10-CM

## 2017-10-23 DIAGNOSIS — Z86.73 HISTORY OF CVA (CEREBROVASCULAR ACCIDENT): Chronic | ICD-10-CM

## 2017-10-23 DIAGNOSIS — N18.4 CKD (CHRONIC KIDNEY DISEASE) STAGE 4, GFR 15-29 ML/MIN (HCC): Chronic | ICD-10-CM

## 2017-10-23 DIAGNOSIS — E78.2 MIXED HYPERLIPIDEMIA: Chronic | ICD-10-CM

## 2017-10-23 DIAGNOSIS — E11.22 TYPE 2 DIABETES MELLITUS WITH STAGE 4 CHRONIC KIDNEY DISEASE, WITHOUT LONG-TERM CURRENT USE OF INSULIN (HCC): Primary | Chronic | ICD-10-CM

## 2017-10-23 PROBLEM — I10 ESSENTIAL HYPERTENSION: Chronic | Status: ACTIVE | Noted: 2017-10-23

## 2017-10-23 RX ORDER — ISOSORBIDE DINITRATE 30 MG/1
30 TABLET ORAL 3 TIMES DAILY
Qty: 270 TAB | Refills: 3 | Status: SHIPPED | OUTPATIENT
Start: 2017-10-23

## 2017-10-23 RX ORDER — WARFARIN SODIUM 5 MG/1
5 TABLET ORAL DAILY
COMMUNITY
Start: 2017-09-27

## 2017-10-23 NOTE — MR AVS SNAPSHOT
Visit Information Date & Time Provider Department Dept. Phone Encounter #  
 10/23/2017 11:30 AM Kay Fitch, [address]  [id number] Follow-up Instructions Return in about 3 months (around 1/23/2018). Upcoming Health Maintenance Date Due MICROALBUMIN Q1 12/26/1975 EYE EXAM RETINAL OR DILATED Q1 12/26/1975 FOBT Q 1 YEAR AGE 50-75 12/26/2015 HEMOGLOBIN A1C Q6M 4/30/2017 INFLUENZA AGE 9 TO ADULT 8/1/2017 LIPID PANEL Q1 11/4/2017 Pneumococcal 19-64 Highest Risk (2 of 3 - PCV13) 2/6/2018 FOOT EXAM Q1 2/6/2018 DTaP/Tdap/Td series (2 - Td) 2/6/2027 Allergies as of 10/23/2017  Review Complete On: 10/23/2017 By: Perry Frias III, DO No Known Allergies Current Immunizations  Reviewed on 9/28/2017 Name Date Influenza Vaccine (Quad) PF 11/4/2016 11:37 AM  
 Pneumococcal Polysaccharide (PPSV-23) 2/6/2017 Tdap 2/6/2017 Not reviewed this visit You Were Diagnosed With   
  
 Codes Comments Type 2 diabetes mellitus with stage 4 chronic kidney disease, without long-term current use of insulin (HCC)    -  Primary ICD-10-CM: E11.22, N18.4 ICD-9-CM: 250.40, 585.4 History of CVA (cerebrovascular accident)     ICD-10-CM: Z86.73 
ICD-9-CM: V12.54   
 CKD (chronic kidney disease) stage 4, GFR 15-29 ml/min (HCC)     ICD-10-CM: N18.4 ICD-9-CM: 585.4 Mixed hyperlipidemia     ICD-10-CM: E78.2 ICD-9-CM: 272.2 Diastolic CHF, chronic (HCC)     ICD-10-CM: I50.32 
ICD-9-CM: 428.32, 428.0 Vitals BP Pulse Temp Resp Height(growth percentile) Weight(growth percentile) 155/74 (BP 1 Location: Left arm, BP Patient Position: Sitting) 74 98.4 °F (36.9 °C) (Oral) 18 5' 11\" (1.803 m) 199 lb 8 oz (90.5 kg) SpO2 BMI Smoking Status 99% 27.82 kg/m2 Never Smoker Vitals History BMI and BSA Data Body Mass Index Body Surface Area  
 27.82 kg/m 2 2.13 m 2 Preferred Pharmacy Pharmacy Name Phone [facility] [address] [phone] Your Updated Medication List  
  
   
This list is accurate as of: 10/23/17 12:22 PM.  Always use your most recent med list.  
  
  
  
  
 aspirin 81 mg chewable tablet Take 81 mg by mouth daily. bumetanide 1 mg tablet Commonly known as:  Lazaro Rebecca Take 1 Tab by mouth daily. calcitRIOL 0.25 mcg capsule Commonly known as:  ROCALTROL  
TAKE ONE CAPSULE BY MOUTH ONCE DAILY  
  
 carvedilol 6.25 mg tablet Commonly known as:  COREG  
TAKE ONE TABLET BY MOUTH TWICE DAILY WITH MEALS  
  
 * doxazosin 2 mg tablet Commonly known as:  CARDURA Take 2 mg by mouth nightly. * doxazosin 1 mg tablet Commonly known as:  CARDURA Take 1 Tab by mouth nightly. glipiZIDE SR 2.5 mg CR tablet Commonly known as:  GLUCOTROL XL Take 1 Tab by mouth daily. hydrALAZINE 25 mg tablet Commonly known as:  APRESOLINE Take 1 Tab by mouth three (3) times daily. isosorbide dinitrate 30 mg tablet Commonly known as:  ISORDIL Take 1 Tab by mouth three (3) times daily. losartan 50 mg tablet Commonly known as:  COZAAR Take 1 Tab by mouth daily. pantoprazole 40 mg tablet Commonly known as:  PROTONIX Take 1 Tab by mouth daily. pravastatin 40 mg tablet Commonly known as:  PRAVACHOL Take 1 Tab by mouth nightly. warfarin 5 mg tablet Commonly known as:  COUMADIN Take 5 mg by mouth daily. * Notice: This list has 2 medication(s) that are the same as other medications prescribed for you. Read the directions carefully, and ask your doctor or other care provider to review them with you. Prescriptions Sent to Pharmacy Refills  
 isosorbide dinitrate (ISORDIL) 30 mg tablet 3 Sig: Take 1 Tab by mouth three (3) times daily.   
 Class: Normal  
 Pharmacy: [id number] Ph #: [phone] Route: Oral  
  
We Performed the Following HEMOGLOBIN A1C WITH EAG [06521 CPT(R)] METABOLIC PANEL, BASIC [38759 CPT(R)] MICROALBUMIN, UR, RAND W/ MICROALBUMIN/CREA RATIO [id number] CPT(R)] PROTHROMBIN TIME + INR [34191 CPT(R)] PSA, DIAGNOSTIC (PROSTATE SPECIFIC AG) [id number] CPT(R)] REFERRAL TO OPHTHALMOLOGY [REF57 Custom] TSH 3RD GENERATION [81432 CPT(R)] Follow-up Instructions Return in about 3 months (around 1/23/2018). To-Do List   
 10/26/2017 4:00 PM  
  Appointment with [address] at [facility] ([phone]) Referral Information Referral ID Referred By Referred To  
  
 [id number] [facility] [address] Radha Alegre, [address] Phone: [phone] Fax: [phone] Visits Status Start Date End Date 1 New Request 10/23/17 10/23/18 If your referral has a status of pending review or denied, additional information will be sent to support the outcome of this decision. Patient Instructions Learning About Tests When You Have Diabetes Why do you need regular diabetes tests? Diabetes can be hard on your body if it's not well controlled. But having tests on a regular schedule can help you and your doctor find problems early, when it's easier to start managing them. What tests do you need? The tests you may have, how often you should have them, and the goals of the tests are: 
A1c blood test. This test shows the average level of blood sugar over the past 2 to 3 months. It helps your doctor see whether blood sugar levels have been staying within your target range. · How often: Every 3 to 6 months · Goal: A blood sugar level in your target range Blood pressure test: This test measures the pressure of blood flow in the arteries. Controlling blood pressure can help prevent damage to nerves and blood vessels.  
· How often: Every 3 to 6 months · Goal: A blood pressure level in your target range Cholesterol test: This test measures the amount of a type of fat in the blood. It is common for people with diabetes to also have high cholesterol. Too much cholesterol in the blood can build up inside the blood vessels and raise the risk for heart attack and stroke. · How often: At the time of your diabetes diagnosis, and as often as your doctor recommends after that · Goal: A cholesterol level in your target range Albumin-creatinine ratio test: This test checks for kidney damage by looking for the protein albumin (say \"al-BYOO-denae\") in the urine. Albumin is normally found in the blood. Kidney damage can let small amounts of it (microalbumin) leak into the urine. · How often: Once a year · Goal: No protein in the urine Blood creatinine test/estimated glomerular filtration (eGFR): The blood creatinine (say \"zyay-GC-ls-neen\") level shows how well your kidneys are working. Creatinine is a waste product that muscles release into the blood. Blood creatinine is used to estimate the glomerular filtration rate. A high level of creatinine and/or a low eGFR may mean your kidneys are not working as well as they should. · How often: Once a year · Goal: Normal level of creatinine in the blood. The eGFR goal is greater than 60 mL/min/1.73 m². Complete foot exam: The doctor checks for foot sores and whether any sensation has been lost. 
· How often: Once a year · Goal: Healthy feet with no foot ulcers or loss of feeling Dental exam and cleaning: The dentist checks for gum disease and tooth decay. People with high blood sugar are more likely to have these problems. · How often: Every 6 months · Goal: Healthy teeth and gums Complete eye exam: High blood sugar levels can damage the eyes. This exam is done by an ophthalmologist or optometrist. It includes a dilated eye exam. The exam shows whether there's damage to the back of the eye (diabetic retinopathy). · How often: Once a year. If you don't have any signs of diabetic retinopathy, your doctor may recommend an exam every 2 years. · Goal: No damage to the back of the eye Thyroid-stimulating hormone (TSH) blood test: This test checks for thyroid disease. Too little thyroid hormone can cause some medicines (like insulin) to stay in the body longer. This can cause low blood sugar. You may be tested if you have high cholesterol or are a woman over 48years old. · How often: As part of your diabetes diagnosis, and as often as your doctor recommends after that · Goal: Normal level of TSH in the blood Follow-up care is a key part of your treatment and safety. Be sure to make and go to all appointments, and call your doctor if you are having problems. It's also a good idea to know your test results and keep a list of the medicines you take. Where can you learn more? Go to http://titi-javier.info/. Enter [id number] in the search box to learn more about \"Learning About Tests When You Have Diabetes. \" Current as of: March 13, 2017 Content Version: 11.3 © 8770-8999 Inkvite. Care instructions adapted under license by [employer] (which disclaims liability or warranty for this information). If you have questions about a medical condition or this instruction, always ask your healthcare professional. [address] any warranty or liability for your use of this information. Make appt with dr Ankit Flores to have eye exam. 
Contact dr Donell York for your coumadin, as he has been in charge of it and following your INR. Introducing [address]! Dear Charmayne Potash: Thank you for requesting a Skyeng account. Our records indicate that you already have an active Skyeng account. You can access your account anytime at [email or web address] Did you know that you can access your hospital and ER discharge instructions at any time in Skyeng?   You can also review all of your test results from your hospital stay or ER visit. Additional Information If you have questions, please visit the Frequently Asked Questions section of the WhipTail website at [email or web address] Remember, WhipTail is NOT to be used for urgent needs. For medical emergencies, dial 911. Now available from your iPhone and Android! Please provide this summary of care documentation to your next provider. Your primary care clinician is listed as Isabelle Padilla If you have any questions after today's visit, please call [phone].

## 2017-10-23 NOTE — PATIENT INSTRUCTIONS
Learning About Tests When You Have Diabetes  Why do you need regular diabetes tests? Diabetes can be hard on your body if it's not well controlled. But having tests on a regular schedule can help you and your doctor find problems early, when it's easier to start managing them. What tests do you need? The tests you may have, how often you should have them, and the goals of the tests are:  A1c blood test. This test shows the average level of blood sugar over the past 2 to 3 months. It helps your doctor see whether blood sugar levels have been staying within your target range. · How often: Every 3 to 6 months  · Goal: A blood sugar level in your target range  Blood pressure test: This test measures the pressure of blood flow in the arteries. Controlling blood pressure can help prevent damage to nerves and blood vessels. · How often: Every 3 to 6 months  · Goal: A blood pressure level in your target range  Cholesterol test: This test measures the amount of a type of fat in the blood. It is common for people with diabetes to also have high cholesterol. Too much cholesterol in the blood can build up inside the blood vessels and raise the risk for heart attack and stroke. · How often: At the time of your diabetes diagnosis, and as often as your doctor recommends after that  · Goal: A cholesterol level in your target range  Albumin-creatinine ratio test: This test checks for kidney damage by looking for the protein albumin (say \"al-BYOO-denae\") in the urine. Albumin is normally found in the blood. Kidney damage can let small amounts of it (microalbumin) leak into the urine. · How often: Once a year  · Goal: No protein in the urine  Blood creatinine test/estimated glomerular filtration (eGFR): The blood creatinine (say \"oaor-CT-ig-neen\") level shows how well your kidneys are working. Creatinine is a waste product that muscles release into the blood.  Blood creatinine is used to estimate the glomerular filtration rate. A high level of creatinine and/or a low eGFR may mean your kidneys are not working as well as they should. · How often: Once a year  · Goal: Normal level of creatinine in the blood. The eGFR goal is greater than 60 mL/min/1.73 m². Complete foot exam: The doctor checks for foot sores and whether any sensation has been lost.  · How often: Once a year  · Goal: Healthy feet with no foot ulcers or loss of feeling  Dental exam and cleaning: The dentist checks for gum disease and tooth decay. People with high blood sugar are more likely to have these problems. · How often: Every 6 months  · Goal: Healthy teeth and gums  Complete eye exam: High blood sugar levels can damage the eyes. This exam is done by an ophthalmologist or optometrist. It includes a dilated eye exam. The exam shows whether there's damage to the back of the eye (diabetic retinopathy). · How often: Once a year. If you don't have any signs of diabetic retinopathy, your doctor may recommend an exam every 2 years. · Goal: No damage to the back of the eye  Thyroid-stimulating hormone (TSH) blood test: This test checks for thyroid disease. Too little thyroid hormone can cause some medicines (like insulin) to stay in the body longer. This can cause low blood sugar. You may be tested if you have high cholesterol or are a woman over 48years old. · How often: As part of your diabetes diagnosis, and as often as your doctor recommends after that  · Goal: Normal level of TSH in the blood  Follow-up care is a key part of your treatment and safety. Be sure to make and go to all appointments, and call your doctor if you are having problems. It's also a good idea to know your test results and keep a list of the medicines you take. Where can you learn more? Go to http://titi-javier.info/. Enter [id number] in the search box to learn more about \"Learning About Tests When You Have Diabetes. \"  Current as of: March 13, 2017  Content Version: 11.3  © 7912-0998 Healthwise, Incorporated. Care instructions adapted under license by [employer] (which disclaims liability or warranty for this information). If you have questions about a medical condition or this instruction, always ask your healthcare professional. [address] any warranty or liability for your use of this information. Make appt with dr Dustin Castelan to have eye exam.  Contact dr Adalberto Simmons for your coumadin, as he has been in charge of it and following your INR.

## 2017-10-23 NOTE — PROGRESS NOTES
Monica Fine is a 46 y.o. male who presents for evaluation of hospital follow up. Last seen by me feb 6, 2017. Had been doing well, until had bilateral cva. [facility], then went to [facility] for inpt rehab. Was there until sept 32. Still doing therapy at home, making good progress. Ran out of his coumadin last week, unclear why he was started on it. Attempting to get records from [facility], as he was not on it when he was admitted there. Apparently cardiology has been following his INR levels, dr steve York. Wife is with him today. ROS:  Constitutional: negative for fevers, chills, anorexia and weight loss  Eyes:   negative for visual disturbance and irritation  ENT:   negative for tinnitus,sore throat,nasal congestion,ear pain,hoarseness  Respiratory:  negative for cough, hemoptysis, dyspnea,wheezing  CV:   negative for chest pain, palpitations, lower extremity edema  GI:   negative for nausea, vomiting, diarrhea, abdominal pain,melena  Genitourinary: negative for frequency, dysuria and hematuria  Musculoskel: negative for myalgias, arthralgias, back pain, muscle weakness, joint pain  Neurological:  negative for headaches, dizziness, focal weakness, numbness  Psychiatric:     Negative for depression or anxiety      Past Medical History:   Diagnosis Date    Diabetes ([facility] [address]     Hypertension     Ill-defined condition     Stroke ([facility] [address]     2009       History reviewed. No pertinent surgical history. Family History   Problem Relation Age of Onset    Hypertension Mother     Cancer Father     High Cholesterol Father        Social History     Social History    Marital status: SINGLE     Spouse name: N/A    Number of children: N/A    Years of education: N/A     Occupational History    Not on file. Social History Main Topics    Smoking status: Never Smoker    Smokeless tobacco: Never Used    Alcohol use No    Drug use:  Yes    Sexual activity: Yes     Partners: Female Birth control/ protection: None     Other Topics Concern    Not on file     Social History Narrative            Visit Vitals    /74 (BP 1 Location: Left arm, BP Patient Position: Sitting)    Pulse 74    Temp 98.4 °F (36.9 °C) (Oral)    Resp 18    Ht 5' 11\" (1.803 m)    Wt 199 lb 8 oz (90.5 kg)    SpO2 99%    BMI 27.82 kg/m2       Physical Examination:   General - Well appearing male  HEENT - PERRL, TM no erythema/opacification, normal nasal turbinates, no oropharyngeal erythema or exudate, MMM  Neck - supple, no bruits, no thyroidomegaly, no lymphadenopathy  Pulm - clear to auscultation bilaterally  Cardio - RRR, normal S1 S2, no murmur  Abd - soft, nontender, no masses, no HSM  Extrem - no edema, +2 distal pulses  Neuro-  No focal deficits, CN intact     Assessment/Plan:    1.  htn--refill isordil, which he ran out of last week. Continue with coreg, hydralazine, cozaar as well  2.  Dm, type 2--check a1c and urine micro, on glipizide  3.  hyperlipids--on pravachol  4. Chronic diastolic chf--well compensated, on bumex, coreg, cozaar  5.  ckd 4--follows with dr Mir harris  6. Hx recurrent cva--was on asa, now apparently also on coumadin. Will check INR  7. Routine adult health maintenance--[facility]. Needs eye exam, referral to dr Aurelia Montague. Had colon with dr Bibiana Chilel.     rtc 3 months        Lemuel Arita III, DO

## 2017-10-24 LAB
ALBUMIN/CREAT UR: 671.6 MG/G CREAT (ref 0–30)
BUN SERPL-MCNC: 52 MG/DL (ref 6–24)
BUN/CREAT SERPL: 15 (ref 9–20)
CALCIUM SERPL-MCNC: 9.5 MG/DL (ref 8.7–10.2)
CHLORIDE SERPL-SCNC: 107 MMOL/L (ref 96–106)
CO2 SERPL-SCNC: 22 MMOL/L (ref 18–29)
CREAT SERPL-MCNC: 3.39 MG/DL (ref 0.76–1.27)
CREAT UR-MCNC: 113.2 MG/DL
EST. AVERAGE GLUCOSE BLD GHB EST-MCNC: 100 MG/DL
GFR SERPLBLD CREATININE-BSD FMLA CKD-EPI: 20 ML/MIN/1.73
GFR SERPLBLD CREATININE-BSD FMLA CKD-EPI: 23 ML/MIN/1.73
GLUCOSE SERPL-MCNC: 110 MG/DL (ref 65–99)
HBA1C MFR BLD: 5.1 % (ref 4.8–5.6)
INR PPP: 1.4 (ref 0.8–1.2)
MICROALBUMIN UR-MCNC: 760.2 UG/ML
POTASSIUM SERPL-SCNC: 5.4 MMOL/L (ref 3.5–5.2)
PROTHROMBIN TIME: 14.2 SEC (ref 9.1–12)
PSA SERPL-MCNC: 0.5 NG/ML (ref 0–4)
SODIUM SERPL-SCNC: 144 MMOL/L (ref 134–144)
TSH SERPL DL<=0.005 MIU/L-ACNC: 1.41 UIU/ML (ref 0.45–4.5)

## 2017-10-24 NOTE — PROGRESS NOTES
Diabetes well controlled, continue glipizide. Kidney function improved somewhat as well, though still not normal.  Thyroid and prostate tests normal.  INR only 1.4. Please call inr results to dr steve calderon's office, cardio, as they can adjust his coumadin.

## 2017-10-24 NOTE — PROGRESS NOTES
Spoke with patient after 2 patient identifiers being note and advised per Dr. Aleshia Corbett Diabetes well controlled, continue glipizide. Kidney function improved somewhat as well, though still not normal. And that Dr. Carly Solorio office will be calling with INR results. Patient expressed understanding and has no further questions at this time.

## 2017-10-26 ENCOUNTER — HOSPITAL ENCOUNTER (OUTPATIENT)
Dept: INFUSION THERAPY | Age: 52
Discharge: HOME | End: 2017-10-26
Payer: COMMERCIAL

## 2017-10-26 VITALS
HEART RATE: 85 BPM | SYSTOLIC BLOOD PRESSURE: 144 MMHG | OXYGEN SATURATION: 97 % | RESPIRATION RATE: 18 BRPM | TEMPERATURE: 98.8 F | DIASTOLIC BLOOD PRESSURE: 71 MMHG

## 2017-10-26 LAB
ALBUMIN SERPL-MCNC: 3.4 G/DL (ref 3.5–5)
ANION GAP SERPL CALC-SCNC: 7 MMOL/L (ref 5–15)
BUN SERPL-MCNC: 53 MG/DL (ref 6–20)
BUN/CREAT SERPL: 15 (ref 12–20)
CALCIUM SERPL-MCNC: 8.9 MG/DL (ref 8.5–10.1)
CALCIUM SERPL-MCNC: 9 MG/DL (ref 8.5–10.1)
CHLORIDE SERPL-SCNC: 109 MMOL/L (ref 97–108)
CO2 SERPL-SCNC: 25 MMOL/L (ref 21–32)
CREAT SERPL-MCNC: 3.42 MG/DL (ref 0.7–1.3)
ERYTHROCYTE [DISTWIDTH] IN BLOOD BY AUTOMATED COUNT: 14.9 % (ref 11.5–14.5)
GLUCOSE SERPL-MCNC: 84 MG/DL (ref 65–100)
HCT VFR BLD AUTO: 29.2 % (ref 36.6–50.3)
HGB BLD-MCNC: 9.3 G/DL (ref 12.1–17)
MCH RBC QN AUTO: 27.8 PG (ref 26–34)
MCHC RBC AUTO-ENTMCNC: 31.8 G/DL (ref 30–36.5)
MCV RBC AUTO: 87.4 FL (ref 80–99)
PHOSPHATE SERPL-MCNC: 3.8 MG/DL (ref 2.6–4.7)
PLATELET # BLD AUTO: 286 K/UL (ref 150–400)
POTASSIUM SERPL-SCNC: 4.8 MMOL/L (ref 3.5–5.1)
PTH-INTACT SERPL-MCNC: 84.9 PG/ML (ref 14–72)
RBC # BLD AUTO: 3.34 M/UL (ref 4.1–5.7)
SODIUM SERPL-SCNC: 141 MMOL/L (ref 136–145)
WBC # BLD AUTO: 6.9 K/UL (ref 4.1–11.1)

## 2017-10-26 PROCEDURE — 83970 ASSAY OF PARATHORMONE: CPT | Performed by: INTERNAL MEDICINE

## 2017-10-26 PROCEDURE — 36415 COLL VENOUS BLD VENIPUNCTURE: CPT | Performed by: INTERNAL MEDICINE

## 2017-10-26 PROCEDURE — 85027 COMPLETE CBC AUTOMATED: CPT | Performed by: INTERNAL MEDICINE

## 2017-10-26 PROCEDURE — 74011250636 HC RX REV CODE- 250/636: Performed by: INTERNAL MEDICINE

## 2017-10-26 PROCEDURE — 96372 THER/PROPH/DIAG INJ SC/IM: CPT

## 2017-10-26 PROCEDURE — 80069 RENAL FUNCTION PANEL: CPT | Performed by: INTERNAL MEDICINE

## 2017-10-26 RX ADMIN — ERYTHROPOIETIN 40000 UNITS: 40000 INJECTION, SOLUTION INTRAVENOUS; SUBCUTANEOUS at 16:36

## 2017-10-26 NOTE — PROGRESS NOTES
[facility] [place]:  [place] - 7243    Visit Vitals    /71 (BP 1 Location: Left arm, BP Patient Position: Sitting)    Pulse 85    Temp 98.8 °F (37.1 °C)    Resp 18    SpO2 97%     Recent Results (from the past 12 hour(s))   CBC W/O DIFF    Collection Time: 10/26/17  4:15 PM   Result Value Ref Range    WBC 6.9 4.1 - 11.1 K/uL    RBC 3.34 (L) 4.10 - 5.70 M/uL    HGB 9.3 (L) 12.1 - 17.0 g/dL    HCT 29.2 (L) 36.6 - 50.3 %    MCV 87.4 80.0 - 99.0 FL    MCH 27.8 26.0 - 34.0 PG    MCHC 31.8 30.0 - 36.5 g/dL    RDW 14.9 (H) 11.5 - 14.5 %    PLATELET 875 726 - 223 K/uL   RENAL FUNCTION PANEL    Collection Time: 10/26/17  4:15 PM   Result Value Ref Range    Sodium 141 136 - 145 mmol/L    Potassium 4.8 3.5 - 5.1 mmol/L    Chloride 109 (H) 97 - 108 mmol/L    CO2 25 21 - 32 mmol/L    Anion gap 7 5 - 15 mmol/L    Glucose 84 65 - 100 mg/dL    BUN 53 (H) 6 - 20 MG/DL    Creatinine 3.42 (H) 0.70 - 1.30 MG/DL    BUN/Creatinine ratio 15 12 - 20      GFR est AA 23 (L) >60 ml/min/1.73m2    GFR est non-AA 19 (L) >60 ml/min/1.73m2    Calcium 8.9 8.5 - 10.1 MG/DL    Phosphorus 3.8 2.6 - 4.7 MG/DL    Albumin 3.4 (L) 3.5 - 5.0 g/dL     CBC w/o diff, Renal panel, and Intact PTH drawn per orders. Intact PTH pending at time of note. See [facility] for results. Assessment - unchanged. Hgb - 9.3  Procrit 40,000 units SQ slowly in left arm.    1640 - Tolerated well. Pt denies any acute problems/changes. Discharged from [facility] ambulatory. No distress. Next appt: 11/24/17 @ 7313.

## 2017-11-06 RX ORDER — PANTOPRAZOLE SODIUM 40 MG/1
40 TABLET, DELAYED RELEASE ORAL DAILY
Qty: 90 TAB | Refills: 3 | Status: SHIPPED | OUTPATIENT
Start: 2017-11-06

## 2017-11-24 ENCOUNTER — HOSPITAL ENCOUNTER (OUTPATIENT)
Dept: INFUSION THERAPY | Age: 52
Discharge: HOME | End: 2017-11-24
Payer: COMMERCIAL

## 2017-11-27 ENCOUNTER — HOSPITAL ENCOUNTER (OUTPATIENT)
Dept: INFUSION THERAPY | Age: 52
Discharge: HOME | End: 2017-11-27
Payer: COMMERCIAL

## 2017-11-27 VITALS
SYSTOLIC BLOOD PRESSURE: 119 MMHG | RESPIRATION RATE: 18 BRPM | TEMPERATURE: 98.6 F | HEART RATE: 98 BPM | DIASTOLIC BLOOD PRESSURE: 72 MMHG | OXYGEN SATURATION: 94 %

## 2017-11-27 LAB
ALBUMIN SERPL-MCNC: 3 G/DL (ref 3.5–5)
ANION GAP SERPL CALC-SCNC: 9 MMOL/L (ref 5–15)
BUN SERPL-MCNC: 85 MG/DL (ref 6–20)
BUN/CREAT SERPL: 20 (ref 12–20)
CALCIUM SERPL-MCNC: 8.6 MG/DL (ref 8.5–10.1)
CHLORIDE SERPL-SCNC: 110 MMOL/L (ref 97–108)
CO2 SERPL-SCNC: 23 MMOL/L (ref 21–32)
CREAT SERPL-MCNC: 4.21 MG/DL (ref 0.7–1.3)
ERYTHROCYTE [DISTWIDTH] IN BLOOD BY AUTOMATED COUNT: 15.9 % (ref 11.5–14.5)
GLUCOSE SERPL-MCNC: 135 MG/DL (ref 65–100)
HCT VFR BLD AUTO: 24.3 % (ref 36.6–50.3)
HGB BLD-MCNC: 7.8 G/DL (ref 12.1–17)
MCH RBC QN AUTO: 27.3 PG (ref 26–34)
MCHC RBC AUTO-ENTMCNC: 32.1 G/DL (ref 30–36.5)
MCV RBC AUTO: 85 FL (ref 80–99)
PHOSPHATE SERPL-MCNC: 3.4 MG/DL (ref 2.6–4.7)
PLATELET # BLD AUTO: 332 K/UL (ref 150–400)
POTASSIUM SERPL-SCNC: 5.1 MMOL/L (ref 3.5–5.1)
RBC # BLD AUTO: 2.86 M/UL (ref 4.1–5.7)
SODIUM SERPL-SCNC: 142 MMOL/L (ref 136–145)
WBC # BLD AUTO: 13.2 K/UL (ref 4.1–11.1)

## 2017-11-27 PROCEDURE — 74011250636 HC RX REV CODE- 250/636: Performed by: INTERNAL MEDICINE

## 2017-11-27 PROCEDURE — 80069 RENAL FUNCTION PANEL: CPT | Performed by: INTERNAL MEDICINE

## 2017-11-27 PROCEDURE — 85027 COMPLETE CBC AUTOMATED: CPT | Performed by: INTERNAL MEDICINE

## 2017-11-27 PROCEDURE — 96372 THER/PROPH/DIAG INJ SC/IM: CPT

## 2017-11-27 PROCEDURE — 36415 COLL VENOUS BLD VENIPUNCTURE: CPT | Performed by: INTERNAL MEDICINE

## 2017-11-27 RX ORDER — METHYLPREDNISOLONE 4 MG/1
4 TABLET ORAL
COMMUNITY

## 2017-11-27 RX ORDER — GABAPENTIN 300 MG/1
300 CAPSULE ORAL 3 TIMES DAILY
COMMUNITY

## 2017-11-27 RX ADMIN — ERYTHROPOIETIN 40000 UNITS: 40000 INJECTION, SOLUTION INTRAVENOUS; SUBCUTANEOUS at 15:56

## 2017-11-27 NOTE — PROGRESS NOTES
[facility] [place]  Arrived - 1600 Ambulatory with a walker. Visit Vitals    /72 (BP 1 Location: Left arm, BP Patient Position: Sitting)    Pulse 98    Temp 98.6 °F (37 °C)    Resp 18    SpO2 94%       Assessment - unchanged Pt voiced no complaints or concerns. Labs:   Recent Results (from the past 12 hour(s))   RENAL FUNCTION PANEL    Collection Time: 11/27/17  3:29 PM   Result Value Ref Range    Sodium 142 136 - 145 mmol/L    Potassium 5.1 3.5 - 5.1 mmol/L    Chloride 110 (H) 97 - 108 mmol/L    CO2 23 21 - 32 mmol/L    Anion gap 9 5 - 15 mmol/L    Glucose 135 (H) 65 - 100 mg/dL    BUN 85 (H) 6 - 20 MG/DL    Creatinine 4.21 (H) 0.70 - 1.30 MG/DL    BUN/Creatinine ratio 20 12 - 20      GFR est AA 18 (L) >60 ml/min/1.73m2    GFR est non-AA 15 (L) >60 ml/min/1.73m2    Calcium 8.6 8.5 - 10.1 MG/DL    Phosphorus 3.4 2.6 - 4.7 MG/DL    Albumin 3.0 (L) 3.5 - 5.0 g/dL   CBC W/O DIFF    Collection Time: 11/27/17  3:29 PM   Result Value Ref Range    WBC 13.2 (H) 4.1 - 11.1 K/uL    RBC 2.86 (L) 4.10 - 5.70 M/uL    HGB 7.8 (L) 12.1 - 17.0 g/dL    HCT 24.3 (L) 36.6 - 50.3 %    MCV 85.0 80.0 - 99.0 FL    MCH 27.3 26.0 - 34.0 PG    MCHC 32.1 30.0 - 36.5 g/dL    RDW 15.9 (H) 11.5 - 14.5 %    PLATELET 755 284 - 802 K/uL       Procrit 40,000 units SQ slowly in Left arm.    1600 - Tolerated well. Pt denies any acute problems/changes. Discharged from [facility] ambulatory. No distress.  Next appt: 12/26/17

## 2017-12-21 ENCOUNTER — APPOINTMENT (OUTPATIENT)
Dept: INFUSION THERAPY | Age: 52
End: 2017-12-21

## 2017-12-26 ENCOUNTER — HOSPITAL ENCOUNTER (OUTPATIENT)
Dept: INFUSION THERAPY | Age: 52
Discharge: HOME | End: 2017-12-26
Payer: COMMERCIAL

## 2017-12-26 VITALS
DIASTOLIC BLOOD PRESSURE: 83 MMHG | SYSTOLIC BLOOD PRESSURE: 136 MMHG | HEART RATE: 82 BPM | TEMPERATURE: 98.2 F | RESPIRATION RATE: 18 BRPM | OXYGEN SATURATION: 96 %

## 2017-12-26 LAB
ALBUMIN SERPL-MCNC: 3.3 G/DL (ref 3.5–5)
ANION GAP SERPL CALC-SCNC: 3 MMOL/L (ref 5–15)
BASOPHILS # BLD: 0 K/UL (ref 0–0.1)
BASOPHILS NFR BLD: 0 % (ref 0–1)
BUN SERPL-MCNC: 55 MG/DL (ref 6–20)
BUN/CREAT SERPL: 15 (ref 12–20)
CALCIUM SERPL-MCNC: 8.8 MG/DL (ref 8.5–10.1)
CHLORIDE SERPL-SCNC: 111 MMOL/L (ref 97–108)
CO2 SERPL-SCNC: 28 MMOL/L (ref 21–32)
CREAT SERPL-MCNC: 3.74 MG/DL (ref 0.7–1.3)
EOSINOPHIL # BLD: 0.2 K/UL (ref 0–0.4)
EOSINOPHIL NFR BLD: 3 % (ref 0–7)
ERYTHROCYTE [DISTWIDTH] IN BLOOD BY AUTOMATED COUNT: 16.8 % (ref 11.5–14.5)
FERRITIN SERPL-MCNC: 278 NG/ML (ref 26–388)
GLUCOSE SERPL-MCNC: 130 MG/DL (ref 65–100)
HCT VFR BLD AUTO: 28.5 % (ref 36.6–50.3)
HGB BLD-MCNC: 8.6 G/DL (ref 12.1–17)
IRON SATN MFR SERPL: 20 % (ref 20–50)
IRON SERPL-MCNC: 46 UG/DL (ref 35–150)
LYMPHOCYTES # BLD: 1.3 K/UL (ref 0.8–3.5)
LYMPHOCYTES NFR BLD: 23 % (ref 12–49)
MCH RBC QN AUTO: 25.6 PG (ref 26–34)
MCHC RBC AUTO-ENTMCNC: 30.2 G/DL (ref 30–36.5)
MCV RBC AUTO: 84.8 FL (ref 80–99)
MONOCYTES # BLD: 0.5 K/UL (ref 0–1)
MONOCYTES NFR BLD: 9 % (ref 5–13)
NEUTS SEG # BLD: 3.6 K/UL (ref 1.8–8)
NEUTS SEG NFR BLD: 65 % (ref 32–75)
PHOSPHATE SERPL-MCNC: 3 MG/DL (ref 2.6–4.7)
PLATELET # BLD AUTO: 304 K/UL (ref 150–400)
POTASSIUM SERPL-SCNC: 4.8 MMOL/L (ref 3.5–5.1)
RBC # BLD AUTO: 3.36 M/UL (ref 4.1–5.7)
SODIUM SERPL-SCNC: 142 MMOL/L (ref 136–145)
TIBC SERPL-MCNC: 225 UG/DL (ref 250–450)
WBC # BLD AUTO: 5.5 K/UL (ref 4.1–11.1)

## 2017-12-26 PROCEDURE — 80069 RENAL FUNCTION PANEL: CPT | Performed by: INTERNAL MEDICINE

## 2017-12-26 PROCEDURE — 82728 ASSAY OF FERRITIN: CPT | Performed by: INTERNAL MEDICINE

## 2017-12-26 PROCEDURE — 74011250636 HC RX REV CODE- 250/636: Performed by: INTERNAL MEDICINE

## 2017-12-26 PROCEDURE — 83540 ASSAY OF IRON: CPT | Performed by: INTERNAL MEDICINE

## 2017-12-26 PROCEDURE — 96372 THER/PROPH/DIAG INJ SC/IM: CPT

## 2017-12-26 PROCEDURE — 85025 COMPLETE CBC W/AUTO DIFF WBC: CPT | Performed by: INTERNAL MEDICINE

## 2017-12-26 PROCEDURE — 36415 COLL VENOUS BLD VENIPUNCTURE: CPT | Performed by: INTERNAL MEDICINE

## 2017-12-26 RX ADMIN — ERYTHROPOIETIN 40000 UNITS: 40000 INJECTION, SOLUTION INTRAVENOUS; SUBCUTANEOUS at 15:34

## 2017-12-26 NOTE — PROGRESS NOTES
[facility] Stay Note:  Arrived - 12 for Procrit    Visit Vitals    /83 (BP 1 Location: Left arm, BP Patient Position: Sitting)    Pulse 82    Temp 98.2 °F (36.8 °C)    Resp 18    SpO2 96%       Assessment - unchanged. Labs drawn: CBC w/ diff, Renal panel, Iron profile, Ferritin. Recent Results (from the past 12 hour(s))   CBC WITH AUTOMATED DIFF    Collection Time: 12/26/17  3:00 PM   Result Value Ref Range    WBC 5.5 4.1 - 11.1 K/uL    RBC 3.36 (L) 4.10 - 5.70 M/uL    HGB 8.6 (L) 12.1 - 17.0 g/dL    HCT 28.5 (L) 36.6 - 50.3 %    MCV 84.8 80.0 - 99.0 FL    MCH 25.6 (L) 26.0 - 34.0 PG    MCHC 30.2 30.0 - 36.5 g/dL    RDW 16.8 (H) 11.5 - 14.5 %    PLATELET 814 722 - 587 K/uL    NEUTROPHILS 65 32 - 75 %    LYMPHOCYTES 23 12 - 49 %    MONOCYTES 9 5 - 13 %    EOSINOPHILS 3 0 - 7 %    BASOPHILS 0 0 - 1 %    ABS. NEUTROPHILS 3.6 1.8 - 8.0 K/UL    ABS. LYMPHOCYTES 1.3 0.8 - 3.5 K/UL    ABS. MONOCYTES 0.5 0.0 - 1.0 K/UL    ABS. EOSINOPHILS 0.2 0.0 - 0.4 K/UL    ABS. BASOPHILS 0.0 0.0 - 0.1 K/UL   RENAL FUNCTION PANEL    Collection Time: 12/26/17  3:00 PM   Result Value Ref Range    Sodium 142 136 - 145 mmol/L    Potassium 4.8 3.5 - 5.1 mmol/L    Chloride 111 (H) 97 - 108 mmol/L    CO2 28 21 - 32 mmol/L    Anion gap 3 (L) 5 - 15 mmol/L    Glucose 130 (H) 65 - 100 mg/dL    BUN 55 (H) 6 - 20 MG/DL    Creatinine 3.74 (H) 0.70 - 1.30 MG/DL    BUN/Creatinine ratio 15 12 - 20      GFR est AA 21 (L) >60 ml/min/1.73m2    GFR est non-AA 17 (L) >60 ml/min/1.73m2    Calcium 8.8 8.5 - 10.1 MG/DL    Phosphorus 3.0 2.6 - 4.7 MG/DL    Albumin 3.3 (L) 3.5 - 5.0 g/dL     *Please see CC for pending lab results. Hgb - 8.6    Medications Given:  Procrit 40,000 units SQ slowly in L arm    1535 - Tolerated well. Pt denies any acute problems/changes. Discharged from [facility] ambulatory. No distress.  Next appt: 1/22/18 at 3 PM.

## 2018-01-18 ENCOUNTER — APPOINTMENT (OUTPATIENT)
Dept: INFUSION THERAPY | Age: 53
End: 2018-01-18
Payer: COMMERCIAL

## 2018-02-19 ENCOUNTER — HOSPITAL ENCOUNTER (OUTPATIENT)
Dept: INFUSION THERAPY | Age: 53
Discharge: HOME | End: 2018-02-19

## 2018-03-19 ENCOUNTER — APPOINTMENT (OUTPATIENT)
Dept: INFUSION THERAPY | Age: 53
End: 2018-03-19

## 2019-12-23 RX ORDER — GLIPIZIDE 2.5 MG/1
TABLET, EXTENDED RELEASE ORAL
Qty: 30 TAB | Refills: 0 | OUTPATIENT
Start: 2019-12-23

## 2019-12-23 NOTE — LETTER
1/23/2020 [facility] [address] [place] [address] Dear  [facility], 
 
Med Refill Appointment Needed: We have received a medication renewal request for you but have been unable to reach you by phone to discuss this further. Without office visits and appropriate monitoring, your healthcare provider cannot be sure that the medication they are prescribing is treating your conditions effectively. Please contact the office at the number above to schedule a follow up visit. Sincerely, 
 
D[facility] [phone] Be aware that although these visits are important to keeping you well, your visit may be subject to fees such as co-pays, deductibles, etc.  Please contact your insurance carrier for your specific plan details if you are unsure.

## 2021-08-03 PROBLEM — I10 ESSENTIAL HYPERTENSION: Status: RESOLVED | Noted: 2017-10-23 | Resolved: 2021-08-03
